# Patient Record
Sex: FEMALE | Race: WHITE | NOT HISPANIC OR LATINO | Employment: OTHER | URBAN - METROPOLITAN AREA
[De-identification: names, ages, dates, MRNs, and addresses within clinical notes are randomized per-mention and may not be internally consistent; named-entity substitution may affect disease eponyms.]

---

## 2017-01-24 ENCOUNTER — COMMUNICATION - HEALTHEAST (OUTPATIENT)
Dept: INTERNAL MEDICINE | Facility: CLINIC | Age: 82
End: 2017-01-24

## 2017-01-24 ENCOUNTER — OFFICE VISIT - HEALTHEAST (OUTPATIENT)
Dept: INTERNAL MEDICINE | Facility: CLINIC | Age: 82
End: 2017-01-24

## 2017-01-24 DIAGNOSIS — E11.9 TYPE 2 DIABETES MELLITUS (H): ICD-10-CM

## 2017-01-24 DIAGNOSIS — M54.16 LUMBAR RADICULOPATHY, RIGHT: ICD-10-CM

## 2017-01-24 LAB — HBA1C MFR BLD: 7.7 % (ref 3.5–6)

## 2017-01-24 ASSESSMENT — MIFFLIN-ST. JEOR: SCORE: 1187.49

## 2017-04-18 ENCOUNTER — COMMUNICATION - HEALTHEAST (OUTPATIENT)
Dept: INTERNAL MEDICINE | Facility: CLINIC | Age: 82
End: 2017-04-18

## 2017-04-18 DIAGNOSIS — E11.9 TYPE 2 DIABETES MELLITUS (H): ICD-10-CM

## 2017-05-06 ENCOUNTER — COMMUNICATION - HEALTHEAST (OUTPATIENT)
Dept: INTERNAL MEDICINE | Facility: CLINIC | Age: 82
End: 2017-05-06

## 2017-05-25 ENCOUNTER — RECORDS - HEALTHEAST (OUTPATIENT)
Dept: ADMINISTRATIVE | Facility: OTHER | Age: 82
End: 2017-05-25

## 2017-06-06 ENCOUNTER — COMMUNICATION - HEALTHEAST (OUTPATIENT)
Dept: INTERNAL MEDICINE | Facility: CLINIC | Age: 82
End: 2017-06-06

## 2017-06-06 DIAGNOSIS — E78.5 HYPERLIPIDEMIA: ICD-10-CM

## 2017-06-27 ENCOUNTER — OFFICE VISIT - HEALTHEAST (OUTPATIENT)
Dept: INTERNAL MEDICINE | Facility: CLINIC | Age: 82
End: 2017-06-27

## 2017-06-27 DIAGNOSIS — E78.2 HYPERLIPEMIA, MIXED: ICD-10-CM

## 2017-06-27 DIAGNOSIS — G47.62 NOCTURNAL LEG CRAMPS: ICD-10-CM

## 2017-06-27 DIAGNOSIS — E11.9 TYPE 2 DIABETES MELLITUS (H): ICD-10-CM

## 2017-06-27 DIAGNOSIS — E03.9 HYPOTHYROIDISM: ICD-10-CM

## 2017-06-27 DIAGNOSIS — K21.00 GASTROESOPHAGEAL REFLUX DISEASE WITH ESOPHAGITIS: ICD-10-CM

## 2017-06-27 DIAGNOSIS — E78.5 HYPERLIPIDEMIA: ICD-10-CM

## 2017-06-27 LAB
CHOLEST SERPL-MCNC: 124 MG/DL
FASTING STATUS PATIENT QL REPORTED: ABNORMAL
HBA1C MFR BLD: 6.1 % (ref 3.5–6)
HDLC SERPL-MCNC: 39 MG/DL
LDLC SERPL CALC-MCNC: 54 MG/DL
TRIGL SERPL-MCNC: 155 MG/DL

## 2017-06-27 ASSESSMENT — MIFFLIN-ST. JEOR: SCORE: 1157.1

## 2017-07-03 ENCOUNTER — COMMUNICATION - HEALTHEAST (OUTPATIENT)
Dept: INTERNAL MEDICINE | Facility: CLINIC | Age: 82
End: 2017-07-03

## 2017-07-10 ENCOUNTER — OFFICE VISIT - HEALTHEAST (OUTPATIENT)
Dept: EDUCATION SERVICES | Facility: CLINIC | Age: 82
End: 2017-07-10

## 2017-07-10 DIAGNOSIS — E11.9 DIABETES MELLITUS TYPE 2, CONTROLLED (H): ICD-10-CM

## 2017-08-18 ENCOUNTER — COMMUNICATION - HEALTHEAST (OUTPATIENT)
Dept: INTERNAL MEDICINE | Facility: CLINIC | Age: 82
End: 2017-08-18

## 2017-08-18 DIAGNOSIS — I10 HYPERTENSION: ICD-10-CM

## 2017-08-18 DIAGNOSIS — E03.9 HYPOTHYROIDISM: ICD-10-CM

## 2017-08-18 DIAGNOSIS — E78.5 HYPERLIPIDEMIA: ICD-10-CM

## 2017-08-24 ENCOUNTER — COMMUNICATION - HEALTHEAST (OUTPATIENT)
Dept: INTERNAL MEDICINE | Facility: CLINIC | Age: 82
End: 2017-08-24

## 2017-09-18 ENCOUNTER — COMMUNICATION - HEALTHEAST (OUTPATIENT)
Dept: INTERNAL MEDICINE | Facility: CLINIC | Age: 82
End: 2017-09-18

## 2017-09-18 DIAGNOSIS — E78.5 HYPERLIPIDEMIA: ICD-10-CM

## 2017-10-05 ENCOUNTER — COMMUNICATION - HEALTHEAST (OUTPATIENT)
Dept: INTERNAL MEDICINE | Facility: CLINIC | Age: 82
End: 2017-10-05

## 2017-11-07 ENCOUNTER — OFFICE VISIT - HEALTHEAST (OUTPATIENT)
Dept: INTERNAL MEDICINE | Facility: CLINIC | Age: 82
End: 2017-11-07

## 2017-11-07 DIAGNOSIS — I10 ESSENTIAL HYPERTENSION: ICD-10-CM

## 2017-11-07 DIAGNOSIS — E11.9 DIABETES TYPE 2, CONTROLLED (H): ICD-10-CM

## 2017-11-07 DIAGNOSIS — E78.2 MIXED HYPERLIPIDEMIA: ICD-10-CM

## 2017-11-07 DIAGNOSIS — E03.9 HYPOTHYROIDISM: ICD-10-CM

## 2017-11-07 DIAGNOSIS — I73.9 CLAUDICATION OF BOTH LOWER EXTREMITIES (H): ICD-10-CM

## 2017-11-07 DIAGNOSIS — E83.42 HYPOMAGNESEMIA: ICD-10-CM

## 2017-11-07 LAB
CHOLEST SERPL-MCNC: 167 MG/DL
FASTING STATUS PATIENT QL REPORTED: ABNORMAL
HBA1C MFR BLD: 6.3 % (ref 3.5–6)
HDLC SERPL-MCNC: 46 MG/DL
LDLC SERPL CALC-MCNC: 96 MG/DL
TRIGL SERPL-MCNC: 124 MG/DL

## 2017-11-07 ASSESSMENT — MIFFLIN-ST. JEOR: SCORE: 1165.72

## 2017-11-10 ENCOUNTER — COMMUNICATION - HEALTHEAST (OUTPATIENT)
Dept: INTERNAL MEDICINE | Facility: CLINIC | Age: 82
End: 2017-11-10

## 2017-11-10 DIAGNOSIS — E03.9 HYPOTHYROIDISM: ICD-10-CM

## 2017-12-14 ENCOUNTER — COMMUNICATION - HEALTHEAST (OUTPATIENT)
Dept: SCHEDULING | Facility: CLINIC | Age: 82
End: 2017-12-14

## 2017-12-22 ENCOUNTER — OFFICE VISIT - HEALTHEAST (OUTPATIENT)
Dept: INTERNAL MEDICINE | Facility: CLINIC | Age: 82
End: 2017-12-22

## 2017-12-22 DIAGNOSIS — K52.9 GASTROENTERITIS: ICD-10-CM

## 2017-12-22 DIAGNOSIS — E11.9 DIABETES MELLITUS, TYPE 2 (H): ICD-10-CM

## 2017-12-22 ASSESSMENT — MIFFLIN-ST. JEOR: SCORE: 1141.22

## 2017-12-27 ENCOUNTER — COMMUNICATION - HEALTHEAST (OUTPATIENT)
Dept: INTERNAL MEDICINE | Facility: CLINIC | Age: 82
End: 2017-12-27

## 2018-05-02 ENCOUNTER — OFFICE VISIT - HEALTHEAST (OUTPATIENT)
Dept: INTERNAL MEDICINE | Facility: CLINIC | Age: 83
End: 2018-05-02

## 2018-05-02 DIAGNOSIS — D12.6 BENIGN NEOPLASM OF COLON: ICD-10-CM

## 2018-05-02 DIAGNOSIS — R39.9 UTI SYMPTOMS: ICD-10-CM

## 2018-05-02 DIAGNOSIS — E11.8 CONTROLLED TYPE 2 DIABETES MELLITUS WITH COMPLICATION, WITHOUT LONG-TERM CURRENT USE OF INSULIN (H): ICD-10-CM

## 2018-05-02 LAB
ALBUMIN SERPL-MCNC: 3.9 G/DL (ref 3.5–5)
ALBUMIN UR-MCNC: NEGATIVE MG/DL
ALP SERPL-CCNC: 63 U/L (ref 45–120)
ALT SERPL W P-5'-P-CCNC: 22 U/L (ref 0–45)
ANION GAP SERPL CALCULATED.3IONS-SCNC: 9 MMOL/L (ref 5–18)
APPEARANCE UR: CLEAR
AST SERPL W P-5'-P-CCNC: 18 U/L (ref 0–40)
BACTERIA #/AREA URNS HPF: ABNORMAL HPF
BILIRUB SERPL-MCNC: 0.5 MG/DL (ref 0–1)
BILIRUB UR QL STRIP: NEGATIVE
BUN SERPL-MCNC: 12 MG/DL (ref 8–28)
CALCIUM SERPL-MCNC: 9.5 MG/DL (ref 8.5–10.5)
CHLORIDE BLD-SCNC: 96 MMOL/L (ref 98–107)
CO2 SERPL-SCNC: 29 MMOL/L (ref 22–31)
COLOR UR AUTO: YELLOW
CREAT SERPL-MCNC: 0.76 MG/DL (ref 0.6–1.1)
GFR SERPL CREATININE-BSD FRML MDRD: >60 ML/MIN/1.73M2
GLUCOSE BLD-MCNC: 110 MG/DL (ref 70–125)
GLUCOSE UR STRIP-MCNC: NEGATIVE MG/DL
HBA1C MFR BLD: 6.8 % (ref 3.5–6)
HGB UR QL STRIP: ABNORMAL
KETONES UR STRIP-MCNC: NEGATIVE MG/DL
LEUKOCYTE ESTERASE UR QL STRIP: NEGATIVE
NITRATE UR QL: NEGATIVE
PH UR STRIP: 7.5 [PH] (ref 5–8)
POTASSIUM BLD-SCNC: 4.1 MMOL/L (ref 3.5–5)
PROT SERPL-MCNC: 6.9 G/DL (ref 6–8)
RBC #/AREA URNS AUTO: ABNORMAL HPF
SODIUM SERPL-SCNC: 134 MMOL/L (ref 136–145)
SP GR UR STRIP: 1.02 (ref 1–1.03)
SQUAMOUS #/AREA URNS AUTO: ABNORMAL LPF
UROBILINOGEN UR STRIP-ACNC: ABNORMAL
WBC #/AREA URNS AUTO: ABNORMAL HPF

## 2018-05-02 ASSESSMENT — MIFFLIN-ST. JEOR: SCORE: 1153.01

## 2018-05-03 ENCOUNTER — HOSPITAL ENCOUNTER (OUTPATIENT)
Dept: ULTRASOUND IMAGING | Facility: CLINIC | Age: 83
Discharge: HOME | End: 2018-05-03
Attending: INTERNAL MEDICINE

## 2018-05-03 DIAGNOSIS — R39.9 UTI SYMPTOMS: ICD-10-CM

## 2018-05-07 ENCOUNTER — COMMUNICATION - HEALTHEAST (OUTPATIENT)
Dept: INTERNAL MEDICINE | Facility: CLINIC | Age: 83
End: 2018-05-07

## 2018-05-11 ENCOUNTER — COMMUNICATION - HEALTHEAST (OUTPATIENT)
Dept: INTERNAL MEDICINE | Facility: CLINIC | Age: 83
End: 2018-05-11

## 2018-05-11 DIAGNOSIS — E11.9 TYPE 2 DIABETES MELLITUS (H): ICD-10-CM

## 2018-06-04 ENCOUNTER — OFFICE VISIT - HEALTHEAST (OUTPATIENT)
Dept: INTERNAL MEDICINE | Facility: CLINIC | Age: 83
End: 2018-06-04

## 2018-06-04 DIAGNOSIS — Z01.818 PREOPERATIVE EXAMINATION: ICD-10-CM

## 2018-06-04 DIAGNOSIS — K63.5 COLON POLYPS: ICD-10-CM

## 2018-06-04 DIAGNOSIS — E11.9 DIABETES TYPE 2, CONTROLLED (H): ICD-10-CM

## 2018-06-04 DIAGNOSIS — I10 HYPERTENSION: ICD-10-CM

## 2018-06-04 DIAGNOSIS — I73.9 PERIPHERAL VASCULAR DISEASE (H): ICD-10-CM

## 2018-06-04 DIAGNOSIS — E78.5 HYPERLIPIDEMIA: ICD-10-CM

## 2018-06-04 LAB
ANION GAP SERPL CALCULATED.3IONS-SCNC: 8 MMOL/L (ref 5–18)
BUN SERPL-MCNC: 10 MG/DL (ref 8–28)
CALCIUM SERPL-MCNC: 10.1 MG/DL (ref 8.5–10.5)
CHLORIDE BLD-SCNC: 94 MMOL/L (ref 98–107)
CO2 SERPL-SCNC: 32 MMOL/L (ref 22–31)
CREAT SERPL-MCNC: 0.78 MG/DL (ref 0.6–1.1)
ERYTHROCYTE [DISTWIDTH] IN BLOOD BY AUTOMATED COUNT: 13.6 % (ref 11–14.5)
GFR SERPL CREATININE-BSD FRML MDRD: >60 ML/MIN/1.73M2
GLUCOSE BLD-MCNC: 92 MG/DL (ref 70–125)
HCT VFR BLD AUTO: 40.8 % (ref 35–47)
HGB BLD-MCNC: 14 G/DL (ref 12–16)
MCH RBC QN AUTO: 28.7 PG (ref 27–34)
MCHC RBC AUTO-ENTMCNC: 34.4 G/DL (ref 32–36)
MCV RBC AUTO: 83 FL (ref 80–100)
PLATELET # BLD AUTO: 245 THOU/UL (ref 140–440)
PMV BLD AUTO: 7 FL (ref 7–10)
POTASSIUM BLD-SCNC: 3.6 MMOL/L (ref 3.5–5)
RBC # BLD AUTO: 4.89 MILL/UL (ref 3.8–5.4)
SODIUM SERPL-SCNC: 134 MMOL/L (ref 136–145)
WBC: 5.8 THOU/UL (ref 4–11)

## 2018-06-04 ASSESSMENT — MIFFLIN-ST. JEOR: SCORE: 1169.29

## 2018-06-06 ENCOUNTER — ANESTHESIA - HEALTHEAST (OUTPATIENT)
Dept: SURGERY | Facility: CLINIC | Age: 83
End: 2018-06-06

## 2018-06-06 ENCOUNTER — SURGERY - HEALTHEAST (OUTPATIENT)
Dept: SURGERY | Facility: CLINIC | Age: 83
End: 2018-06-06

## 2018-06-06 ASSESSMENT — MIFFLIN-ST. JEOR: SCORE: 1156.25

## 2018-06-28 ENCOUNTER — COMMUNICATION - HEALTHEAST (OUTPATIENT)
Dept: INTERNAL MEDICINE | Facility: CLINIC | Age: 83
End: 2018-06-28

## 2018-06-28 DIAGNOSIS — E78.5 HYPERLIPIDEMIA: ICD-10-CM

## 2018-08-25 ENCOUNTER — COMMUNICATION - HEALTHEAST (OUTPATIENT)
Dept: INTERNAL MEDICINE | Facility: CLINIC | Age: 83
End: 2018-08-25

## 2018-08-25 DIAGNOSIS — K21.00 GASTROESOPHAGEAL REFLUX DISEASE WITH ESOPHAGITIS: ICD-10-CM

## 2018-09-25 ENCOUNTER — COMMUNICATION - HEALTHEAST (OUTPATIENT)
Dept: INTERNAL MEDICINE | Facility: CLINIC | Age: 83
End: 2018-09-25

## 2018-09-25 DIAGNOSIS — I10 HYPERTENSION: ICD-10-CM

## 2018-09-25 DIAGNOSIS — E78.5 HYPERLIPIDEMIA: ICD-10-CM

## 2018-10-05 ENCOUNTER — COMMUNICATION - HEALTHEAST (OUTPATIENT)
Dept: INTERNAL MEDICINE | Facility: CLINIC | Age: 83
End: 2018-10-05

## 2018-10-05 DIAGNOSIS — E78.5 HYPERLIPIDEMIA: ICD-10-CM

## 2018-10-30 ENCOUNTER — COMMUNICATION - HEALTHEAST (OUTPATIENT)
Dept: INTERNAL MEDICINE | Facility: CLINIC | Age: 83
End: 2018-10-30

## 2018-10-30 DIAGNOSIS — J20.9 ACUTE BRONCHITIS: ICD-10-CM

## 2018-11-15 ENCOUNTER — COMMUNICATION - HEALTHEAST (OUTPATIENT)
Dept: SCHEDULING | Facility: CLINIC | Age: 83
End: 2018-11-15

## 2018-11-23 ENCOUNTER — OFFICE VISIT - HEALTHEAST (OUTPATIENT)
Dept: INTERNAL MEDICINE | Facility: CLINIC | Age: 83
End: 2018-11-23

## 2018-11-23 DIAGNOSIS — I10 ESSENTIAL HYPERTENSION, BENIGN: ICD-10-CM

## 2018-11-23 DIAGNOSIS — Z51.81 ENCOUNTER FOR MEDICATION MONITORING: ICD-10-CM

## 2018-11-23 DIAGNOSIS — E11.9 DM2 (DIABETES MELLITUS, TYPE 2) (H): ICD-10-CM

## 2018-11-23 DIAGNOSIS — E78.2 MIXED HYPERLIPIDEMIA: ICD-10-CM

## 2018-11-23 DIAGNOSIS — M54.16 LUMBAR RADICULOPATHY: ICD-10-CM

## 2018-11-23 DIAGNOSIS — Z12.31 VISIT FOR SCREENING MAMMOGRAM: ICD-10-CM

## 2018-11-23 DIAGNOSIS — E11.9 TYPE 2 DIABETES MELLITUS WITHOUT COMPLICATION, WITHOUT LONG-TERM CURRENT USE OF INSULIN (H): ICD-10-CM

## 2018-11-23 LAB
ALBUMIN SERPL-MCNC: 4.1 G/DL (ref 3.5–5)
ALP SERPL-CCNC: 81 U/L (ref 45–120)
ALT SERPL W P-5'-P-CCNC: 27 U/L (ref 0–45)
ANION GAP SERPL CALCULATED.3IONS-SCNC: 11 MMOL/L (ref 5–18)
AST SERPL W P-5'-P-CCNC: 22 U/L (ref 0–40)
BILIRUB SERPL-MCNC: 0.4 MG/DL (ref 0–1)
BUN SERPL-MCNC: 13 MG/DL (ref 8–28)
CALCIUM SERPL-MCNC: 10.2 MG/DL (ref 8.5–10.5)
CHLORIDE BLD-SCNC: 97 MMOL/L (ref 98–107)
CHOLEST SERPL-MCNC: 150 MG/DL
CO2 SERPL-SCNC: 30 MMOL/L (ref 22–31)
CREAT SERPL-MCNC: 0.73 MG/DL (ref 0.6–1.1)
CREAT UR-MCNC: 50.3 MG/DL
ERYTHROCYTE [DISTWIDTH] IN BLOOD BY AUTOMATED COUNT: 11.5 % (ref 11–14.5)
FASTING STATUS PATIENT QL REPORTED: ABNORMAL
GFR SERPL CREATININE-BSD FRML MDRD: >60 ML/MIN/1.73M2
GLUCOSE BLD-MCNC: 78 MG/DL (ref 70–125)
HBA1C MFR BLD: 6.9 % (ref 3.5–6)
HCT VFR BLD AUTO: 41 % (ref 35–47)
HDLC SERPL-MCNC: 43 MG/DL
HGB BLD-MCNC: 14 G/DL (ref 12–16)
LDLC SERPL CALC-MCNC: 60 MG/DL
MCH RBC QN AUTO: 28.3 PG (ref 27–34)
MCHC RBC AUTO-ENTMCNC: 34 G/DL (ref 32–36)
MCV RBC AUTO: 83 FL (ref 80–100)
MICROALBUMIN UR-MCNC: <0.5 MG/DL (ref 0–1.99)
MICROALBUMIN/CREAT UR: NORMAL MG/G
PLATELET # BLD AUTO: 249 THOU/UL (ref 140–440)
PMV BLD AUTO: 7.2 FL (ref 7–10)
POTASSIUM BLD-SCNC: 3.8 MMOL/L (ref 3.5–5)
PROT SERPL-MCNC: 7.2 G/DL (ref 6–8)
RBC # BLD AUTO: 4.93 MILL/UL (ref 3.8–5.4)
SODIUM SERPL-SCNC: 138 MMOL/L (ref 136–145)
TRIGL SERPL-MCNC: 234 MG/DL
WBC: 7.6 THOU/UL (ref 4–11)

## 2018-11-23 ASSESSMENT — MIFFLIN-ST. JEOR: SCORE: 1155.11

## 2018-11-28 ENCOUNTER — COMMUNICATION - HEALTHEAST (OUTPATIENT)
Dept: INTERNAL MEDICINE | Facility: CLINIC | Age: 83
End: 2018-11-28

## 2018-11-30 ENCOUNTER — COMMUNICATION - HEALTHEAST (OUTPATIENT)
Dept: INTERNAL MEDICINE | Facility: CLINIC | Age: 83
End: 2018-11-30

## 2018-11-30 ENCOUNTER — HOSPITAL ENCOUNTER (OUTPATIENT)
Dept: MRI IMAGING | Facility: CLINIC | Age: 83
Discharge: HOME | End: 2018-11-30
Attending: INTERNAL MEDICINE

## 2018-11-30 DIAGNOSIS — M54.16 LUMBAR RADICULOPATHY: ICD-10-CM

## 2018-12-03 ENCOUNTER — HOSPITAL ENCOUNTER (OUTPATIENT)
Dept: CT IMAGING | Facility: CLINIC | Age: 83
Discharge: HOME | End: 2018-12-03
Attending: INTERNAL MEDICINE

## 2018-12-03 ENCOUNTER — HOSPITAL ENCOUNTER (OUTPATIENT)
Dept: MAMMOGRAPHY | Facility: CLINIC | Age: 83
Discharge: HOME | End: 2018-12-03
Attending: INTERNAL MEDICINE

## 2018-12-03 DIAGNOSIS — M54.16 LUMBAR RADICULOPATHY: ICD-10-CM

## 2018-12-03 DIAGNOSIS — Z12.31 VISIT FOR SCREENING MAMMOGRAM: ICD-10-CM

## 2018-12-11 ENCOUNTER — COMMUNICATION - HEALTHEAST (OUTPATIENT)
Dept: INTERNAL MEDICINE | Facility: CLINIC | Age: 83
End: 2018-12-11

## 2018-12-11 DIAGNOSIS — E03.9 HYPOTHYROIDISM: ICD-10-CM

## 2018-12-13 ENCOUNTER — COMMUNICATION - HEALTHEAST (OUTPATIENT)
Dept: INTERNAL MEDICINE | Facility: CLINIC | Age: 83
End: 2018-12-13

## 2018-12-13 ENCOUNTER — AMBULATORY - HEALTHEAST (OUTPATIENT)
Dept: INTERNAL MEDICINE | Facility: CLINIC | Age: 83
End: 2018-12-13

## 2018-12-13 DIAGNOSIS — M54.16 LUMBAR RADICULOPATHY: ICD-10-CM

## 2018-12-19 ENCOUNTER — HOSPITAL ENCOUNTER (OUTPATIENT)
Dept: PHYSICAL MEDICINE AND REHAB | Facility: CLINIC | Age: 83
Discharge: HOME | End: 2018-12-19
Attending: INTERNAL MEDICINE

## 2018-12-19 DIAGNOSIS — M48.061 LUMBAR FORAMINAL STENOSIS: ICD-10-CM

## 2018-12-19 DIAGNOSIS — M54.16 LEFT LUMBAR RADICULITIS: ICD-10-CM

## 2018-12-19 DIAGNOSIS — M48.062 SPINAL STENOSIS OF LUMBAR REGION WITH NEUROGENIC CLAUDICATION: ICD-10-CM

## 2018-12-19 DIAGNOSIS — M54.42 ACUTE LEFT-SIDED LOW BACK PAIN WITH LEFT-SIDED SCIATICA: ICD-10-CM

## 2018-12-19 DIAGNOSIS — M54.16 LEFT LUMBAR RADICULOPATHY: ICD-10-CM

## 2019-03-08 ENCOUNTER — RECORDS - HEALTHEAST (OUTPATIENT)
Dept: ADMINISTRATIVE | Facility: OTHER | Age: 84
End: 2019-03-08

## 2019-03-20 ENCOUNTER — OFFICE VISIT - HEALTHEAST (OUTPATIENT)
Dept: INTERNAL MEDICINE | Facility: CLINIC | Age: 84
End: 2019-03-20

## 2019-03-20 DIAGNOSIS — S80.02XA CONTUSION OF LEFT KNEE AND LOWER LEG, INITIAL ENCOUNTER: ICD-10-CM

## 2019-03-20 DIAGNOSIS — M48.061 SPINAL STENOSIS OF LUMBAR REGION WITHOUT NEUROGENIC CLAUDICATION: ICD-10-CM

## 2019-03-20 DIAGNOSIS — S80.12XA CONTUSION OF LEFT KNEE AND LOWER LEG, INITIAL ENCOUNTER: ICD-10-CM

## 2019-03-20 DIAGNOSIS — E11.8 CONTROLLED TYPE 2 DIABETES MELLITUS WITH COMPLICATION, WITHOUT LONG-TERM CURRENT USE OF INSULIN (H): ICD-10-CM

## 2019-03-20 ASSESSMENT — MIFFLIN-ST. JEOR: SCORE: 1132.15

## 2019-04-04 ENCOUNTER — RECORDS - HEALTHEAST (OUTPATIENT)
Dept: ADMINISTRATIVE | Facility: OTHER | Age: 84
End: 2019-04-04

## 2019-05-01 ENCOUNTER — COMMUNICATION - HEALTHEAST (OUTPATIENT)
Dept: INTERNAL MEDICINE | Facility: CLINIC | Age: 84
End: 2019-05-01

## 2019-05-01 DIAGNOSIS — I10 HYPERTENSION: ICD-10-CM

## 2019-05-01 DIAGNOSIS — E11.9 TYPE 2 DIABETES MELLITUS (H): ICD-10-CM

## 2019-05-07 ENCOUNTER — COMMUNICATION - HEALTHEAST (OUTPATIENT)
Dept: INTERNAL MEDICINE | Facility: CLINIC | Age: 84
End: 2019-05-07

## 2019-06-14 ENCOUNTER — RECORDS - HEALTHEAST (OUTPATIENT)
Dept: ADMINISTRATIVE | Facility: OTHER | Age: 84
End: 2019-06-14

## 2019-07-01 ENCOUNTER — OFFICE VISIT - HEALTHEAST (OUTPATIENT)
Dept: INTERNAL MEDICINE | Facility: CLINIC | Age: 84
End: 2019-07-01

## 2019-07-01 DIAGNOSIS — G89.29 CHRONIC MIDLINE LOW BACK PAIN WITH LEFT-SIDED SCIATICA: ICD-10-CM

## 2019-07-01 DIAGNOSIS — E11.8 CONTROLLED TYPE 2 DIABETES MELLITUS WITH COMPLICATION, WITHOUT LONG-TERM CURRENT USE OF INSULIN (H): ICD-10-CM

## 2019-07-01 DIAGNOSIS — E03.9 ACQUIRED HYPOTHYROIDISM: ICD-10-CM

## 2019-07-01 DIAGNOSIS — M89.9 DISORDER OF BONE AND CARTILAGE: ICD-10-CM

## 2019-07-01 DIAGNOSIS — M79.605 PAIN OF LEFT LOWER EXTREMITY: ICD-10-CM

## 2019-07-01 DIAGNOSIS — I73.9 PERIPHERAL VASCULAR DISEASE (H): ICD-10-CM

## 2019-07-01 DIAGNOSIS — M94.9 DISORDER OF BONE AND CARTILAGE: ICD-10-CM

## 2019-07-01 DIAGNOSIS — M54.42 CHRONIC MIDLINE LOW BACK PAIN WITH LEFT-SIDED SCIATICA: ICD-10-CM

## 2019-07-01 LAB
ANION GAP SERPL CALCULATED.3IONS-SCNC: 8 MMOL/L (ref 5–18)
BUN SERPL-MCNC: 14 MG/DL (ref 8–28)
CALCIUM SERPL-MCNC: 10 MG/DL (ref 8.5–10.5)
CHLORIDE BLD-SCNC: 102 MMOL/L (ref 98–107)
CO2 SERPL-SCNC: 28 MMOL/L (ref 22–31)
CREAT SERPL-MCNC: 0.74 MG/DL (ref 0.6–1.1)
CREAT UR-MCNC: 30.5 MG/DL
GFR SERPL CREATININE-BSD FRML MDRD: >60 ML/MIN/1.73M2
GLUCOSE BLD-MCNC: 140 MG/DL (ref 70–125)
HBA1C MFR BLD: 6.4 % (ref 3.5–6)
MICROALBUMIN UR-MCNC: 0.59 MG/DL (ref 0–1.99)
MICROALBUMIN/CREAT UR: 19.3 MG/G
POTASSIUM BLD-SCNC: 3.8 MMOL/L (ref 3.5–5)
SODIUM SERPL-SCNC: 138 MMOL/L (ref 136–145)
TSH SERPL DL<=0.005 MIU/L-ACNC: 0.61 UIU/ML (ref 0.3–5)

## 2019-07-01 ASSESSMENT — MIFFLIN-ST. JEOR: SCORE: 1141.22

## 2019-07-03 ENCOUNTER — COMMUNICATION - HEALTHEAST (OUTPATIENT)
Dept: INTERNAL MEDICINE | Facility: CLINIC | Age: 84
End: 2019-07-03

## 2019-07-08 ENCOUNTER — HOSPITAL ENCOUNTER (OUTPATIENT)
Dept: ULTRASOUND IMAGING | Facility: CLINIC | Age: 84
Discharge: HOME | End: 2019-07-08
Attending: INTERNAL MEDICINE

## 2019-07-08 DIAGNOSIS — I73.9 PERIPHERAL VASCULAR DISEASE (H): ICD-10-CM

## 2019-07-08 DIAGNOSIS — M79.605 PAIN OF LEFT LOWER EXTREMITY: ICD-10-CM

## 2019-07-09 ENCOUNTER — OFFICE VISIT - HEALTHEAST (OUTPATIENT)
Dept: PHYSICAL THERAPY | Facility: REHABILITATION | Age: 84
End: 2019-07-09

## 2019-07-09 DIAGNOSIS — G89.29 CHRONIC MIDLINE LOW BACK PAIN WITH LEFT-SIDED SCIATICA: ICD-10-CM

## 2019-07-09 DIAGNOSIS — M53.86 DECREASED ROM OF LUMBAR SPINE: ICD-10-CM

## 2019-07-09 DIAGNOSIS — M54.42 CHRONIC MIDLINE LOW BACK PAIN WITH LEFT-SIDED SCIATICA: ICD-10-CM

## 2019-07-09 DIAGNOSIS — R29.898 LEFT LEG WEAKNESS: ICD-10-CM

## 2019-07-09 DIAGNOSIS — M25.662 DECREASED RANGE OF MOTION OF LEFT LOWER EXTREMITY: ICD-10-CM

## 2019-07-09 DIAGNOSIS — R20.0 NUMBNESS OF LEFT LOWER EXTREMITY: ICD-10-CM

## 2019-07-10 ENCOUNTER — COMMUNICATION - HEALTHEAST (OUTPATIENT)
Dept: INTERNAL MEDICINE | Facility: CLINIC | Age: 84
End: 2019-07-10

## 2019-07-11 ENCOUNTER — OFFICE VISIT - HEALTHEAST (OUTPATIENT)
Dept: PHYSICAL THERAPY | Facility: REHABILITATION | Age: 84
End: 2019-07-11

## 2019-07-11 DIAGNOSIS — M25.662 DECREASED RANGE OF MOTION OF LEFT LOWER EXTREMITY: ICD-10-CM

## 2019-07-11 DIAGNOSIS — M53.86 DECREASED ROM OF LUMBAR SPINE: ICD-10-CM

## 2019-07-11 DIAGNOSIS — M54.42 CHRONIC MIDLINE LOW BACK PAIN WITH LEFT-SIDED SCIATICA: ICD-10-CM

## 2019-07-11 DIAGNOSIS — R29.898 LEFT LEG WEAKNESS: ICD-10-CM

## 2019-07-11 DIAGNOSIS — G89.29 CHRONIC MIDLINE LOW BACK PAIN WITH LEFT-SIDED SCIATICA: ICD-10-CM

## 2019-07-23 ENCOUNTER — OFFICE VISIT - HEALTHEAST (OUTPATIENT)
Dept: PHYSICAL THERAPY | Facility: REHABILITATION | Age: 84
End: 2019-07-23

## 2019-07-23 DIAGNOSIS — M53.86 DECREASED ROM OF LUMBAR SPINE: ICD-10-CM

## 2019-07-23 DIAGNOSIS — R29.898 LEFT LEG WEAKNESS: ICD-10-CM

## 2019-07-23 DIAGNOSIS — M54.42 CHRONIC MIDLINE LOW BACK PAIN WITH LEFT-SIDED SCIATICA: ICD-10-CM

## 2019-07-23 DIAGNOSIS — M25.662 DECREASED RANGE OF MOTION OF LEFT LOWER EXTREMITY: ICD-10-CM

## 2019-07-23 DIAGNOSIS — R20.0 NUMBNESS OF LEFT LOWER EXTREMITY: ICD-10-CM

## 2019-07-23 DIAGNOSIS — G89.29 CHRONIC MIDLINE LOW BACK PAIN WITH LEFT-SIDED SCIATICA: ICD-10-CM

## 2019-07-25 ENCOUNTER — OFFICE VISIT - HEALTHEAST (OUTPATIENT)
Dept: PHYSICAL THERAPY | Facility: REHABILITATION | Age: 84
End: 2019-07-25

## 2019-07-25 DIAGNOSIS — G89.29 CHRONIC MIDLINE LOW BACK PAIN WITH LEFT-SIDED SCIATICA: ICD-10-CM

## 2019-07-25 DIAGNOSIS — M53.86 DECREASED ROM OF LUMBAR SPINE: ICD-10-CM

## 2019-07-25 DIAGNOSIS — M25.662 DECREASED RANGE OF MOTION OF LEFT LOWER EXTREMITY: ICD-10-CM

## 2019-07-25 DIAGNOSIS — R20.0 NUMBNESS OF LEFT LOWER EXTREMITY: ICD-10-CM

## 2019-07-25 DIAGNOSIS — M54.42 CHRONIC MIDLINE LOW BACK PAIN WITH LEFT-SIDED SCIATICA: ICD-10-CM

## 2019-07-25 DIAGNOSIS — R29.898 LEFT LEG WEAKNESS: ICD-10-CM

## 2019-08-01 ENCOUNTER — OFFICE VISIT - HEALTHEAST (OUTPATIENT)
Dept: PHYSICAL THERAPY | Facility: REHABILITATION | Age: 84
End: 2019-08-01

## 2019-08-01 DIAGNOSIS — R20.0 NUMBNESS OF LEFT LOWER EXTREMITY: ICD-10-CM

## 2019-08-01 DIAGNOSIS — M25.662 DECREASED RANGE OF MOTION OF LEFT LOWER EXTREMITY: ICD-10-CM

## 2019-08-01 DIAGNOSIS — G89.29 CHRONIC MIDLINE LOW BACK PAIN WITH LEFT-SIDED SCIATICA: ICD-10-CM

## 2019-08-01 DIAGNOSIS — M53.86 DECREASED ROM OF LUMBAR SPINE: ICD-10-CM

## 2019-08-01 DIAGNOSIS — R29.898 LEFT LEG WEAKNESS: ICD-10-CM

## 2019-08-01 DIAGNOSIS — M54.42 CHRONIC MIDLINE LOW BACK PAIN WITH LEFT-SIDED SCIATICA: ICD-10-CM

## 2019-08-07 ENCOUNTER — OFFICE VISIT - HEALTHEAST (OUTPATIENT)
Dept: PHYSICAL THERAPY | Facility: REHABILITATION | Age: 84
End: 2019-08-07

## 2019-08-07 DIAGNOSIS — M53.86 DECREASED ROM OF LUMBAR SPINE: ICD-10-CM

## 2019-08-07 DIAGNOSIS — M25.662 DECREASED RANGE OF MOTION OF LEFT LOWER EXTREMITY: ICD-10-CM

## 2019-08-07 DIAGNOSIS — G89.29 CHRONIC MIDLINE LOW BACK PAIN WITH LEFT-SIDED SCIATICA: ICD-10-CM

## 2019-08-07 DIAGNOSIS — M54.42 CHRONIC MIDLINE LOW BACK PAIN WITH LEFT-SIDED SCIATICA: ICD-10-CM

## 2019-08-07 DIAGNOSIS — R29.898 LEFT LEG WEAKNESS: ICD-10-CM

## 2019-08-07 DIAGNOSIS — R20.0 NUMBNESS OF LEFT LOWER EXTREMITY: ICD-10-CM

## 2019-08-26 ENCOUNTER — COMMUNICATION - HEALTHEAST (OUTPATIENT)
Dept: INTERNAL MEDICINE | Facility: CLINIC | Age: 84
End: 2019-08-26

## 2019-08-26 DIAGNOSIS — I10 ESSENTIAL HYPERTENSION: ICD-10-CM

## 2019-09-23 ENCOUNTER — COMMUNICATION - HEALTHEAST (OUTPATIENT)
Dept: INTERNAL MEDICINE | Facility: CLINIC | Age: 84
End: 2019-09-23

## 2019-09-23 DIAGNOSIS — E78.5 HYPERLIPIDEMIA: ICD-10-CM

## 2019-10-01 ENCOUNTER — OFFICE VISIT - HEALTHEAST (OUTPATIENT)
Dept: INTERNAL MEDICINE | Facility: CLINIC | Age: 84
End: 2019-10-01

## 2019-10-01 DIAGNOSIS — I70.213 ATHEROSCLEROSIS OF NATIVE ARTERY OF BOTH LOWER EXTREMITIES WITH INTERMITTENT CLAUDICATION (H): ICD-10-CM

## 2019-10-01 DIAGNOSIS — I10 HYPERTENSION: ICD-10-CM

## 2019-10-01 DIAGNOSIS — Z00.00 MEDICARE ANNUAL WELLNESS VISIT, SUBSEQUENT: ICD-10-CM

## 2019-10-01 DIAGNOSIS — D12.6 BENIGN NEOPLASM OF COLON, UNSPECIFIED PART OF COLON: ICD-10-CM

## 2019-10-01 DIAGNOSIS — I10 ESSENTIAL HYPERTENSION: ICD-10-CM

## 2019-10-01 DIAGNOSIS — C43.61 MALIGNANT MELANOMA OF SKIN OF UPPER LIMB, INCLUDING SHOULDER, RIGHT (H): ICD-10-CM

## 2019-10-01 DIAGNOSIS — E11.9 TYPE 2 DIABETES MELLITUS (H): ICD-10-CM

## 2019-10-01 DIAGNOSIS — E78.2 MIXED HYPERLIPIDEMIA: ICD-10-CM

## 2019-10-01 DIAGNOSIS — M89.9 DISORDER OF BONE AND CARTILAGE: ICD-10-CM

## 2019-10-01 DIAGNOSIS — M48.061 SPINAL STENOSIS OF LUMBAR REGION WITHOUT NEUROGENIC CLAUDICATION: ICD-10-CM

## 2019-10-01 DIAGNOSIS — E11.8 CONTROLLED TYPE 2 DIABETES MELLITUS WITH COMPLICATION, WITHOUT LONG-TERM CURRENT USE OF INSULIN (H): ICD-10-CM

## 2019-10-01 DIAGNOSIS — K21.9 GASTROESOPHAGEAL REFLUX DISEASE WITHOUT ESOPHAGITIS: ICD-10-CM

## 2019-10-01 DIAGNOSIS — E03.9 HYPOTHYROIDISM: ICD-10-CM

## 2019-10-01 DIAGNOSIS — M94.9 DISORDER OF BONE AND CARTILAGE: ICD-10-CM

## 2019-10-01 DIAGNOSIS — E78.5 HYPERLIPIDEMIA: ICD-10-CM

## 2019-10-01 LAB
ALBUMIN SERPL-MCNC: 4.1 G/DL (ref 3.5–5)
ALBUMIN UR-MCNC: NEGATIVE MG/DL
ALP SERPL-CCNC: 79 U/L (ref 45–120)
ALT SERPL W P-5'-P-CCNC: 20 U/L (ref 0–45)
ANION GAP SERPL CALCULATED.3IONS-SCNC: 8 MMOL/L (ref 5–18)
APPEARANCE UR: CLEAR
AST SERPL W P-5'-P-CCNC: 17 U/L (ref 0–40)
BACTERIA #/AREA URNS HPF: ABNORMAL HPF
BILIRUB SERPL-MCNC: 0.4 MG/DL (ref 0–1)
BILIRUB UR QL STRIP: NEGATIVE
BUN SERPL-MCNC: 12 MG/DL (ref 8–28)
CALCIUM SERPL-MCNC: 9.9 MG/DL (ref 8.5–10.5)
CHLORIDE BLD-SCNC: 98 MMOL/L (ref 98–107)
CHOLEST SERPL-MCNC: 131 MG/DL
CO2 SERPL-SCNC: 28 MMOL/L (ref 22–31)
COLOR UR AUTO: YELLOW
CREAT SERPL-MCNC: 0.7 MG/DL (ref 0.6–1.1)
CREAT UR-MCNC: 66.9 MG/DL
ERYTHROCYTE [DISTWIDTH] IN BLOOD BY AUTOMATED COUNT: 11.6 % (ref 11–14.5)
FASTING STATUS PATIENT QL REPORTED: YES
GFR SERPL CREATININE-BSD FRML MDRD: >60 ML/MIN/1.73M2
GLUCOSE BLD-MCNC: 114 MG/DL (ref 70–125)
GLUCOSE UR STRIP-MCNC: NEGATIVE MG/DL
HBA1C MFR BLD: 6.5 % (ref 3.5–6)
HCT VFR BLD AUTO: 42.9 % (ref 35–47)
HDLC SERPL-MCNC: 46 MG/DL
HGB BLD-MCNC: 14.1 G/DL (ref 12–16)
HGB UR QL STRIP: ABNORMAL
KETONES UR STRIP-MCNC: NEGATIVE MG/DL
LDLC SERPL CALC-MCNC: 60 MG/DL
LEUKOCYTE ESTERASE UR QL STRIP: ABNORMAL
MCH RBC QN AUTO: 28.3 PG (ref 27–34)
MCHC RBC AUTO-ENTMCNC: 32.9 G/DL (ref 32–36)
MCV RBC AUTO: 86 FL (ref 80–100)
MICROALBUMIN UR-MCNC: 0.64 MG/DL (ref 0–1.99)
MICROALBUMIN/CREAT UR: 9.6 MG/G
NITRATE UR QL: NEGATIVE
PH UR STRIP: 7.5 [PH] (ref 5–8)
PLATELET # BLD AUTO: 246 THOU/UL (ref 140–440)
PMV BLD AUTO: 7.3 FL (ref 7–10)
POTASSIUM BLD-SCNC: 4.2 MMOL/L (ref 3.5–5)
PROT SERPL-MCNC: 6.9 G/DL (ref 6–8)
RBC # BLD AUTO: 4.99 MILL/UL (ref 3.8–5.4)
RBC #/AREA URNS AUTO: ABNORMAL HPF
SODIUM SERPL-SCNC: 134 MMOL/L (ref 136–145)
SP GR UR STRIP: 1.02 (ref 1–1.03)
SQUAMOUS #/AREA URNS AUTO: ABNORMAL LPF
TRIGL SERPL-MCNC: 125 MG/DL
TSH SERPL DL<=0.005 MIU/L-ACNC: 0.36 UIU/ML (ref 0.3–5)
UROBILINOGEN UR STRIP-ACNC: ABNORMAL
WBC #/AREA URNS AUTO: ABNORMAL HPF
WBC: 6 THOU/UL (ref 4–11)

## 2019-10-01 ASSESSMENT — MIFFLIN-ST. JEOR: SCORE: 1123.08

## 2019-10-01 ASSESSMENT — ANXIETY QUESTIONNAIRES
2. NOT BEING ABLE TO STOP OR CONTROL WORRYING: NOT AT ALL
1. FEELING NERVOUS, ANXIOUS, OR ON EDGE: NOT AT ALL

## 2019-10-02 ENCOUNTER — COMMUNICATION - HEALTHEAST (OUTPATIENT)
Dept: INTERNAL MEDICINE | Facility: CLINIC | Age: 84
End: 2019-10-02

## 2019-10-02 LAB — BACTERIA SPEC CULT: NO GROWTH

## 2019-10-10 ENCOUNTER — RECORDS - HEALTHEAST (OUTPATIENT)
Dept: ADMINISTRATIVE | Facility: OTHER | Age: 84
End: 2019-10-10

## 2019-11-25 ENCOUNTER — COMMUNICATION - HEALTHEAST (OUTPATIENT)
Dept: SCHEDULING | Facility: CLINIC | Age: 84
End: 2019-11-25

## 2019-11-25 DIAGNOSIS — J20.9 ACUTE BRONCHITIS, UNSPECIFIED ORGANISM: ICD-10-CM

## 2019-12-03 ENCOUNTER — COMMUNICATION - HEALTHEAST (OUTPATIENT)
Dept: INTERNAL MEDICINE | Facility: CLINIC | Age: 84
End: 2019-12-03

## 2019-12-03 DIAGNOSIS — E78.5 HYPERLIPIDEMIA: ICD-10-CM

## 2020-01-10 ENCOUNTER — RECORDS - HEALTHEAST (OUTPATIENT)
Dept: ADMINISTRATIVE | Facility: OTHER | Age: 85
End: 2020-01-10

## 2020-01-20 ENCOUNTER — HOSPITAL ENCOUNTER (OUTPATIENT)
Dept: MAMMOGRAPHY | Facility: CLINIC | Age: 85
Discharge: HOME | End: 2020-01-20
Attending: INTERNAL MEDICINE

## 2020-01-20 DIAGNOSIS — Z12.31 VISIT FOR SCREENING MAMMOGRAM: ICD-10-CM

## 2020-04-10 ENCOUNTER — RECORDS - HEALTHEAST (OUTPATIENT)
Dept: ADMINISTRATIVE | Facility: OTHER | Age: 85
End: 2020-04-10

## 2020-07-03 ENCOUNTER — COMMUNICATION - HEALTHEAST (OUTPATIENT)
Dept: INTERNAL MEDICINE | Facility: CLINIC | Age: 85
End: 2020-07-03

## 2020-07-03 DIAGNOSIS — E11.9 TYPE 2 DIABETES MELLITUS (H): ICD-10-CM

## 2020-07-15 ENCOUNTER — RECORDS - HEALTHEAST (OUTPATIENT)
Dept: ADMINISTRATIVE | Facility: OTHER | Age: 85
End: 2020-07-15

## 2020-08-14 ENCOUNTER — OFFICE VISIT - HEALTHEAST (OUTPATIENT)
Dept: INTERNAL MEDICINE | Facility: CLINIC | Age: 85
End: 2020-08-14

## 2020-08-14 DIAGNOSIS — I70.213 ATHEROSCLEROSIS OF NATIVE ARTERY OF BOTH LOWER EXTREMITIES WITH INTERMITTENT CLAUDICATION (H): ICD-10-CM

## 2020-08-14 DIAGNOSIS — I10 ESSENTIAL HYPERTENSION: ICD-10-CM

## 2020-08-14 DIAGNOSIS — E78.2 MIXED HYPERLIPIDEMIA: ICD-10-CM

## 2020-08-14 DIAGNOSIS — D69.6 THROMBOCYTOPENIA, UNSPECIFIED (H): ICD-10-CM

## 2020-08-14 DIAGNOSIS — R60.0 PERIPHERAL EDEMA: ICD-10-CM

## 2020-08-14 DIAGNOSIS — E11.8 CONTROLLED TYPE 2 DIABETES MELLITUS WITH COMPLICATION, WITHOUT LONG-TERM CURRENT USE OF INSULIN (H): ICD-10-CM

## 2020-08-14 LAB
ANION GAP SERPL CALCULATED.3IONS-SCNC: 9 MMOL/L (ref 5–18)
BUN SERPL-MCNC: 11 MG/DL (ref 8–28)
CALCIUM SERPL-MCNC: 10 MG/DL (ref 8.5–10.5)
CHLORIDE BLD-SCNC: 101 MMOL/L (ref 98–107)
CHOLEST SERPL-MCNC: 142 MG/DL
CO2 SERPL-SCNC: 28 MMOL/L (ref 22–31)
CREAT SERPL-MCNC: 0.7 MG/DL (ref 0.6–1.1)
ERYTHROCYTE [DISTWIDTH] IN BLOOD BY AUTOMATED COUNT: 12.7 % (ref 11–14.5)
FASTING STATUS PATIENT QL REPORTED: YES
GFR SERPL CREATININE-BSD FRML MDRD: >60 ML/MIN/1.73M2
GLUCOSE BLD-MCNC: 108 MG/DL (ref 70–125)
HBA1C MFR BLD: 6.3 %
HCT VFR BLD AUTO: 43.2 % (ref 35–47)
HDLC SERPL-MCNC: 44 MG/DL
HGB BLD-MCNC: 14.2 G/DL (ref 12–16)
LDLC SERPL CALC-MCNC: 71 MG/DL
MCH RBC QN AUTO: 28 PG (ref 27–34)
MCHC RBC AUTO-ENTMCNC: 32.8 G/DL (ref 32–36)
MCV RBC AUTO: 85 FL (ref 80–100)
PLATELET # BLD AUTO: 227 THOU/UL (ref 140–440)
PMV BLD AUTO: 7.8 FL (ref 7–10)
POTASSIUM BLD-SCNC: 4.4 MMOL/L (ref 3.5–5)
RBC # BLD AUTO: 5.06 MILL/UL (ref 3.8–5.4)
SODIUM SERPL-SCNC: 138 MMOL/L (ref 136–145)
TRIGL SERPL-MCNC: 135 MG/DL
WBC: 5.5 THOU/UL (ref 4–11)

## 2020-08-17 ENCOUNTER — COMMUNICATION - HEALTHEAST (OUTPATIENT)
Dept: INTERNAL MEDICINE | Facility: CLINIC | Age: 85
End: 2020-08-17

## 2020-10-05 ENCOUNTER — COMMUNICATION - HEALTHEAST (OUTPATIENT)
Dept: INTERNAL MEDICINE | Facility: CLINIC | Age: 85
End: 2020-10-05

## 2020-10-05 DIAGNOSIS — I10 ESSENTIAL HYPERTENSION, BENIGN: ICD-10-CM

## 2020-10-05 DIAGNOSIS — I10 ESSENTIAL HYPERTENSION: ICD-10-CM

## 2020-10-06 ENCOUNTER — COMMUNICATION - HEALTHEAST (OUTPATIENT)
Dept: SCHEDULING | Facility: CLINIC | Age: 85
End: 2020-10-06

## 2020-10-12 ENCOUNTER — COMMUNICATION - HEALTHEAST (OUTPATIENT)
Dept: INTERNAL MEDICINE | Facility: CLINIC | Age: 85
End: 2020-10-12

## 2020-10-12 DIAGNOSIS — I10 HYPERTENSION: ICD-10-CM

## 2020-10-27 ENCOUNTER — COMMUNICATION - HEALTHEAST (OUTPATIENT)
Dept: INTERNAL MEDICINE | Facility: CLINIC | Age: 85
End: 2020-10-27

## 2020-10-27 DIAGNOSIS — E78.5 HYPERLIPIDEMIA: ICD-10-CM

## 2020-11-05 ENCOUNTER — RECORDS - HEALTHEAST (OUTPATIENT)
Dept: ADMINISTRATIVE | Facility: OTHER | Age: 85
End: 2020-11-05

## 2020-11-06 ENCOUNTER — VIRTUAL VISIT (OUTPATIENT)
Dept: FAMILY MEDICINE | Facility: OTHER | Age: 85
End: 2020-11-06

## 2020-11-06 NOTE — PROGRESS NOTES
"Date: 2020 12:21:57  Clinician: Berkley Bravo  Clinician NPI: [id number]  Patient: Abril Pinto  Patient : 1933-10-17  Patient Address: [facility] [place] [address]  Patient Phone: [phone]  Visit Protocol: URI  Patient Summary:  Abril Mario is a 87 year old ( : 1933-10-17 ) female who initiated a [facility] Visit for COVID-19 (Coronavirus) evaluation and screening. When asked the question \"Please sign me up to receive news, health information and promotions. \", Abril Mario responded \"No\".    When asked when her symptoms started, Abril Mario reported that she does not have any symptoms.   She denies taking antibiotic medication in the past month and having recent facial or sinus surgery in the past 60 days.    Pertinent COVID-19 (Coronavirus) information  Abril Mario does not work or volunteer as healthcare worker or a . In the past 14 days, Abril Mario has not worked or volunteered at a healthcare facility or group living setting.   In the past 14 days, she also has not lived in a congregate living setting.   Abril Mario has had a close contact with a laboratory-confirmed COVID-19 patient in the last 14 days. She was not exposed at her work. Date Abril Mario was exposed to the laboratory-confirmed COVID-19 patient: 2020   Additional information about contact with COVID-19 (Coronavirus) patient as reported by the patient (free text): In a garage with the door open   Abril Mario is not living in the same household with the COVID-19 positive patient. She was in an enclosed space for greater than 15 minutes with the COVID-19 patient.   During the encounter, neither were wearing masks.   Since 2019, Abril Mario has not been tested for COVID-19 and has not had upper respiratory infection or influenza-like illness.   Pertinent medical history  Abril Mario does not get yeast infections when she takes antibiotics.   Abril Mario does not need a return to work/school note.   Weight: 157 lbs   Abril Mario does " not smoke or use smokeless tobacco.   Weight: 157 lbs    MEDICATIONS: Cholesterol Relief oral, Thyrolar-1 oral, ALLERGIES: NKDA  Clinician Response:  Dear Abril Mario,   Your symptoms show that you may have coronavirus (COVID-19). This illness can cause fever, cough and trouble breathing. Many people get a mild case and get better on their own. Some people can get very sick.  What should I do?  We would like to test you for this virus.   1. Please call [phone] to schedule your visit. Explain that you were referred by [facility] to have a COVID-19 test. Be ready to [facility].  * If you need to schedule in [facility] please call [phone] or for [employer] employees please call [phone].  * If you need to schedule in the [place] area please call [phone]. [place] employees call [phone].  The following will serve as your written order for this COVID Test, ordered by me, for the indication of suspected COVID [Z20.828]: The test will be ordered in [employer], our electronic health record, after you are scheduled. It will show as ordered and authorized by Avery Spann MD.  Order: COVID-19 (Coronavirus) PCR for SYMPTOMATIC testing from [facility].   2. When it's time for your COVID test:  Stay at least 6 feet away from others. (If someone will drive you to your test, stay in the backseat, as far away from the  as you can.)   Cover your mouth and nose with a mask, tissue or washcloth.  Go straight to the testing site. Don't make any stops on the way there or back.      3.Starting now: Stay home and away from others (self-isolate) until:   You've had no fever---and no medicine that reduces fever---for one full day (24 hours). And...   Your other symptoms have gotten better. For example, your cough or breathing has improved. And...   At least 10 days have passed since your symptoms started.       During this time, don't leave the house except for testing or medical care.   Stay in your own  "room, even for meals. Use your own bathroom if you can.   Stay away from others in your home. No hugging, kissing or shaking hands. No visitors.  Don't go to work, school or anywhere else.    Clean \"high touch\" surfaces often (doorknobs, counters, handles, etc.). Use a household cleaning spray or wipes. You'll find a full list of  on the EPA website: www.epa.gov/pesticide-registration/list-n-disinfectants-use-against-sars-cov-2.   Cover your mouth and nose with a mask, tissue or washcloth to avoid spreading germs.  Wash your hands and face often. Use soap and water.  Caregivers in these groups are at risk for severe illness due to COVID-19:  o People 65 years and older  o People who live in a nursing home or long-term care facility  o People with chronic disease (lung, heart, cancer, diabetes, kidney, liver, immunologic)  o People who have a weakened immune system, including those who:   Are in cancer treatment  Take medicine that weakens the immune system, such as corticosteroids  Had a bone marrow or organ transplant  Have an immune deficiency  Have poorly controlled HIV or AIDS  Are obese (body mass index of 40 or higher)  Smoke regularly   o Caregivers should wear gloves while washing dishes, handling laundry and cleaning bedrooms and bathrooms.  o Use caution when washing and drying laundry: Don't shake dirty laundry, and use the warmest water setting that you can.  o For more tips, go to www.cdc.gov/coronavirus/2019-ncov/downloads/10Things.pdf.    4.Sign up for [employer]. We know it's scary to hear that you might have COVID-19. We want to track your symptoms to make sure you're okay over the next 2 weeks. Please look for an email from [employer]---this is a free, online program that we'll use to keep in touch. To sign up, follow the link in the email. Learn more at [email or web address]  How can I take care of myself?   Get lots of rest. Drink extra fluids (unless a doctor has told you " not to).   Take Tylenol (acetaminophen) for fever or pain. If you have liver or kidney problems, ask your family doctor if it's okay to take Tylenol.   Adults can take either:    650 mg (two 325 mg pills) every 4 to 6 hours, or...   1,000 mg (two 500 mg pills) every 8 hours as needed.    Note: Don't take more than 3,000 mg in one day. Acetaminophen is found in many medicines (both prescribed and over-the-counter medicines). Read all labels to be sure you don't take too much.   For children, check the Tylenol bottle for the right dose. The dose is based on the child's age or weight.    If you have other health problems (like cancer, heart failure, an organ transplant or severe kidney disease): Call your specialty clinic if you don't feel better in the next 2 days.       Know when to call 911. Emergency warning signs include:    Trouble breathing or shortness of breath Pain or pressure in the chest that doesn't go away Feeling confused like you haven't felt before, or not being able to wake up Bluish-colored lips or face.  Where can I get more information?   [facility] -- About COVID-19: [email or web address]   CDC -- What to Do If You're Sick: www.cdc.gov/coronavirus/2019-ncov/about/steps-when-sick.html   CDC -- Ending Home Isolation: www.cdc.gov/coronavirus/2019-ncov/hcp/disposition-in-home-patients.html   CDC -- Caring for Someone: www.cdc.gov/coronavirus/2019-ncov/if-you-are-sick/care-for-someone.html   [facility] -- Interim Guidance for Hospital Discharge to Home: www.health.[facility].mn.us/diseases/coronavirus/hcp/hospdischarge.pdf   [facility] clinical trials (COVID-19 research studies): [email or web address]    Below are the COVID-19 hotlines at the [place] Department of Health ([facility]). Interpreters are available.    For health questions: Call 353-038-5411 or 1-407.837.4521 (7 a.m. to 7 p.m.) For questions about schools and childcare: Call 104-583-8596 or  [phone] (7 a.m. to 7 p.m.)    Diagnosis: Cough  Diagnosis ICD: R05

## 2020-11-08 DIAGNOSIS — Z20.822 SUSPECTED COVID-19 VIRUS INFECTION: Primary | ICD-10-CM

## 2020-11-09 ENCOUNTER — OFFICE VISIT (OUTPATIENT)
Dept: URGENT CARE | Facility: URGENT CARE | Age: 85
End: 2020-11-09
Payer: COMMERCIAL

## 2020-11-09 DIAGNOSIS — Z20.822 SUSPECTED COVID-19 VIRUS INFECTION: ICD-10-CM

## 2020-11-09 PROCEDURE — 99207 PR NO CHARGE NURSE ONLY: CPT

## 2020-11-09 PROCEDURE — U0003 INFECTIOUS AGENT DETECTION BY NUCLEIC ACID (DNA OR RNA); SEVERE ACUTE RESPIRATORY SYNDROME CORONAVIRUS 2 (SARS-COV-2) (CORONAVIRUS DISEASE [COVID-19]), AMPLIFIED PROBE TECHNIQUE, MAKING USE OF HIGH THROUGHPUT TECHNOLOGIES AS DESCRIBED BY CMS-2020-01-R: HCPCS | Performed by: FAMILY MEDICINE

## 2020-11-10 LAB
SARS-COV-2 RNA SPEC QL NAA+PROBE: NOT DETECTED
SPECIMEN SOURCE: NORMAL

## 2020-11-13 ENCOUNTER — COMMUNICATION - HEALTHEAST (OUTPATIENT)
Dept: SCHEDULING | Facility: CLINIC | Age: 85
End: 2020-11-13

## 2020-12-28 ENCOUNTER — COMMUNICATION - HEALTHEAST (OUTPATIENT)
Dept: INTERNAL MEDICINE | Facility: CLINIC | Age: 85
End: 2020-12-28

## 2020-12-28 DIAGNOSIS — E03.9 HYPOTHYROIDISM: ICD-10-CM

## 2020-12-28 DIAGNOSIS — K21.9 GASTROESOPHAGEAL REFLUX DISEASE WITHOUT ESOPHAGITIS: ICD-10-CM

## 2020-12-31 ENCOUNTER — COMMUNICATION - HEALTHEAST (OUTPATIENT)
Dept: INTERNAL MEDICINE | Facility: CLINIC | Age: 85
End: 2020-12-31

## 2020-12-31 DIAGNOSIS — E78.5 HYPERLIPIDEMIA: ICD-10-CM

## 2021-02-05 ENCOUNTER — RECORDS - HEALTHEAST (OUTPATIENT)
Dept: ADMINISTRATIVE | Facility: OTHER | Age: 86
End: 2021-02-05

## 2021-02-15 ENCOUNTER — AMBULATORY - HEALTHEAST (OUTPATIENT)
Dept: NURSING | Facility: CLINIC | Age: 86
End: 2021-02-15

## 2021-03-08 ENCOUNTER — AMBULATORY - HEALTHEAST (OUTPATIENT)
Dept: NURSING | Facility: CLINIC | Age: 86
End: 2021-03-08

## 2021-05-26 ENCOUNTER — RECORDS - HEALTHEAST (OUTPATIENT)
Dept: ADMINISTRATIVE | Facility: CLINIC | Age: 86
End: 2021-05-26

## 2021-05-27 ENCOUNTER — RECORDS - HEALTHEAST (OUTPATIENT)
Dept: ADMINISTRATIVE | Facility: CLINIC | Age: 86
End: 2021-05-27

## 2021-05-28 NOTE — TELEPHONE ENCOUNTER
Refill Approved    Rx renewed per Medication Renewal Policy. Medication was last renewed on 9/25/18 & 5/11/18.    Last office visit 3/20/19    [facility], [facility] Triage/Med Refill 5/2/2019     Requested Prescriptions   Pending Prescriptions Disp Refills     metoprolol succinate (TOPROL-XL) 25 MG [Pharmacy Med Name: METOPROLOL ER 25MG  TAB] 90 tablet 1     Sig: TAKE 1 TABLET BY MOUTH ONCE DAILY       Beta-Blockers Refill Protocol Passed - 5/1/2019  3:10 PM        Passed - PCP or prescribing provider visit in past 12 months or next 3 months     Last office visit with prescriber/PCP: 11/23/2018 Beltran Walker MD OR same dept: 3/20/2019 Aayush Velásquez MD OR same specialty: 3/20/2019 Aayush Velásquez MD  Last physical: 11/7/2017 Last MTM visit: Visit date not found   Next visit within 3 mo: Visit date not found  Next physical within 3 mo: Visit date not found  Prescriber OR PCP: Beltran Walker MD  Last diagnosis associated with med order: 1. Hypertension  - metoprolol succinate (TOPROL-XL) 25 MG [Pharmacy Med Name: METOPROLOL ER 25MG  TAB]; TAKE 1 TABLET BY MOUTH ONCE DAILY  Dispense: 90 tablet; Refill: 1    2. Type 2 diabetes mellitus (H)  - metFORMIN (GLUCOPHAGE) 500 MG tablet [Pharmacy Med Name: METFORMIN 500MG TAB]; TAKE 1 TABLET BY MOUTH TWICE DAILY WITH MEALS  Dispense: 180 tablet; Refill: 1    If protocol passes may refill for 12 months if within 3 months of last provider visit (or a total of 15 months).             Passed - Blood pressure filed in past 12 months     BP Readings from Last 1 Encounters:   03/20/19 124/60             metFORMIN (GLUCOPHAGE) 500 MG tablet [Pharmacy Med Name: METFORMIN 500MG TAB] 180 tablet 1     Sig: TAKE 1 TABLET BY MOUTH TWICE DAILY WITH MEALS       Metformin Refill Protocol Passed - 5/1/2019  3:10 PM        Passed - Blood pressure in last 12 months     BP Readings from Last 1 Encounters:   03/20/19 124/60             Passed - LFT or AST or ALT in last 12 months      Albumin   Date Value Ref Range Status   11/23/2018 4.1 3.5 - 5.0 g/dL Final     Bilirubin, Total   Date Value Ref Range Status   11/23/2018 0.4 0.0 - 1.0 mg/dL Final     Bilirubin, Direct   Date Value Ref Range Status   12/16/2010 0.1 <0.6 mg/dL Final     Alkaline Phosphatase   Date Value Ref Range Status   11/23/2018 81 45 - 120 U/L Final     AST   Date Value Ref Range Status   11/23/2018 22 0 - 40 U/L Final     ALT   Date Value Ref Range Status   11/23/2018 27 0 - 45 U/L Final     Protein, Total   Date Value Ref Range Status   11/23/2018 7.2 6.0 - 8.0 g/dL Final                Passed - GFR or Serum Creatinine in last 6 months     GFR MDRD Non Af Amer   Date Value Ref Range Status   11/23/2018 >60 >60 mL/min/1.73m2 Final     GFR MDRD Af Amer   Date Value Ref Range Status   11/23/2018 >60 >60 mL/min/1.73m2 Final             Passed - Visit with PCP or prescribing provider visit in last 6 months or next 3 months     Last office visit with prescriber/PCP: 11/23/2018 OR same dept: 3/20/2019 Aayush Velásquez MD OR same specialty: 3/20/2019 Aayush Velásquez MD Last physical: Visit date not found Last MTM visit: Visit date not found         Next appt within 3 mo: Visit date not found  Next physical within 3 mo: Visit date not found  Prescriber OR PCP: Beltran Walker MD  Last diagnosis associated with med order: 1. Hypertension  - metoprolol succinate (TOPROL-XL) 25 MG [Pharmacy Med Name: METOPROLOL ER 25MG  TAB]; TAKE 1 TABLET BY MOUTH ONCE DAILY  Dispense: 90 tablet; Refill: 1    2. Type 2 diabetes mellitus (H)  - metFORMIN (GLUCOPHAGE) 500 MG tablet [Pharmacy Med Name: METFORMIN 500MG TAB]; TAKE 1 TABLET BY MOUTH TWICE DAILY WITH MEALS  Dispense: 180 tablet; Refill: 1     If protocol passes may refill for 12 months if within 3 months of last provider visit (or a total of 15 months).           Passed - A1C in last 6 months     Hemoglobin A1c   Date Value Ref Range Status   11/23/2018 6.9 (H) 3.5 - 6.0 % Final                Passed - Microalbumin in last year      Microalbumin, Random Urine   Date Value Ref Range Status   11/23/2018 <0.50 0.00 - 1.99 mg/dL Final

## 2021-05-29 ENCOUNTER — RECORDS - HEALTHEAST (OUTPATIENT)
Dept: ADMINISTRATIVE | Facility: CLINIC | Age: 86
End: 2021-05-29

## 2021-05-30 ENCOUNTER — RECORDS - HEALTHEAST (OUTPATIENT)
Dept: ADMINISTRATIVE | Facility: CLINIC | Age: 86
End: 2021-05-30

## 2021-05-30 VITALS — HEIGHT: 62 IN | WEIGHT: 176.2 LBS | BODY MASS INDEX: 32.42 KG/M2

## 2021-05-30 NOTE — PROGRESS NOTES
[facility] Daily Progress     Patient Name: Abril Pinto  Date: 2019  Visit #:      auth to 10/7/19  PTA visit #:  1  Referral Diagnosis: Chronic midline low back pain with left-sided sciatica  Referring provider: Beltran Walker MD  Visit Diagnosis:     ICD-10-CM    1. Chronic midline low back pain with left-sided sciatica M54.42     G89.29    2. Left leg weakness R29.898    3. Decreased ROM of lumbar spine M53.86    4. Decreased range of motion of left lower extremity M25.662          Assessment:     Cues for HEP/posture needed  Complaint with HEP  Pt would benefit with continuing skilled physical therapy treatments    Goal Status:  Ongoing  Pt. will demonstrate/verbalize independence in self-management of condition in : 12 weeks  Pt. will be independent with home exercise program in : 12 weeks  Pt. will report decreased intensity, frequency of : in 12 weeks  Pt. will be able to walk : 30 minutes;with less pain;with less difficulty;for community mobility;in 12 weeks  Patient will transfer: sit/stand;for in/out of chair;with less pain;in 12 weeks    Pt will: be able to carry groceries into house with less pain and difficulty      Plan / Patient Education:     Continue POC  Plan for next visit: review exercises, Continue with MT to left lower back and left buttock region,  standing hip extension, abd, and flexion of left LE  Subjective:     Pain Ratin/10 right sided back pain c/o with seated HS stretch  alieve this morning  Sleeping is good  Walking is difffculty with tightness in calves  Ex 2x/day      Objective:     Reviewed HEP/posture cues needed  Added clams 10x mary  Continued MT left SL per flow sheet  Tolerated treatment well    Treatment Today  2019    TREATMENT MINUTES COMMENTS   Evaluation     Self-care/ Home management     Manual therapy 10 Left lumbar paraspinals and left gluteal muscle STM in right sidelying x10' with moderate pressure   Neuromuscular Re-education      Therapeutic Activity     Therapeutic Exercises 13 Ex flow sheet cues needed   Gait training     Modality__________________                Total 23    Blank areas are intentional and mean the treatment did not include these items.       Pillo Power  7/11/2019

## 2021-05-30 NOTE — PROGRESS NOTES
ASSESSMENT:  1. Controlled type 2 diabetes mellitus with complication, without long-term current use of insulin (H)  She is on metformin.  Overall diabetic control is excellent with a hemoglobin A1c of 6.4  - Glycosylated Hemoglobin A1c  - Microalbumin, Random Urine  - Basic Metabolic Panel    2. Osteopenia  Bone density was good for age when checked in August 2016.  I feel she could wait another year or 2 before rechecking    3. Acquired hypothyroidism  TSH was elevated when last checked.  This will be checked again today  - Thyroid Stimulating Hormone (TSH)    4. Chronic midline low back pain with left-sided sciatica  She was referred to the spine clinic last winter.  Was supposed to be referred to physical therapy but this never occurred.  Is still noting back discomfort.  Physical therapy referral will be authorized.  Her CT of the back from 12/3/2018 was reviewed.  - Ambulatory referral to Physical Therapy    5. Pain of left lower extremity  Has a history of peripheral vascular disease with stenting in the right femoral area in 2014.  Would be concerned about developing disease on the left  - US Arterial Legs Bilateral Complete; Future    6. Peripheral vascular disease (H)  See above  - US Arterial Legs Bilateral Complete; Future    7.  Hypercholesterolemia  She is treated with pravastatin and Zetia.  She had a past history of poor tolerance to simvastatin due to muscle aches    PLAN:  Patient Instructions   1..  Schedule arterial ultrasound of legs.    2. Schedule physical therapy for low back pain.    3.  I will notify you of test results    4.  See in three to four months (November). Annual wellness visit.      Orders Placed This Encounter   Procedures     US Arterial Legs Bilateral Complete     Standing Status:   Future     Standing Expiration Date:   7/1/2020     Order Specific Question:   Reason for Exam (Describe Symptoms):     Answer:   leg pain at rest and with activity     Order Specific Question:    Can the procedure be changed per Radiologist protocol?     Answer:   Yes     Order Specific Question:   If this test is abnormal would you like a consult at the [facility]?     Answer:   No     Glycosylated Hemoglobin A1c     Microalbumin, Random Urine     Basic Metabolic Panel     Thyroid Stimulating Hormone (TSH)     Ambulatory referral to Physical Therapy     Referral Priority:   Routine     Referral Type:   Physical Therapy     Referral Reason:   Evaluation and Treatment     Requested Specialty:   Physical Therapy     Number of Visits Requested:   1     There are no discontinued medications.    Return in about 3 months (around 10/1/2019) for Annual physical.      ASSESSED PROBLEMS:  1. Osteopenia     2. Controlled type 2 diabetes mellitus with complication, without long-term current use of insulin (H)  Glycosylated Hemoglobin A1c    Microalbumin, Random Urine    Basic Metabolic Panel   3. Acquired hypothyroidism  Thyroid Stimulating Hormone (TSH)   4. Chronic midline low back pain with left-sided sciatica  Ambulatory referral to Physical Therapy   5. Pain of left lower extremity  US Arterial Legs Bilateral Complete   6. Peripheral vascular disease (H)  US Arterial Legs Bilateral Complete       CHIEF COMPLAINT:  Chief Complaint   Patient presents with     Diabetes     Due A1C, pend the order     Leg Swelling     X three months of intermittent legt leg swelling and feels numb       HISTORY OF PRESENT ILLNESS:  Abril Mario is a 85 y.o. female presenting to the clinic today to follow up on diabetes and with complaints of leg cramping.     Leg cramping: She has been experiencing a numbness and occasional cramping of her left lower leg. When the leg cramps, it seems to flex inward. She notices it the most when she is in bed and will have to get up and walk around until it goes away. She occasionally gets cramps in her right leg as well but not as severe as in the left. She has some pain in her legs with walking, and  "she thinks some of it is related to her spine. She was seen at the spine center once last fall, and they were going to send her to physical therapy, but she never heard from them. She feels the numbness in her leg all the time and would be interested in making appointments with therapy now.     Diabetes: Her last hemoglobin A1c was 6.9% on 11/23/2018, and she will have one checked today. She last had her eyes checked in February.     Health Maintenance: Her DXA scan in 2016 showed normal bone density.     REVIEW OF SYSTEMS:   She has significant pain in a toe on her left foot. She tolerates her medications well. She is not fasting today. All other systems are negative.    PFSH:  Reviewed, as below.     TOBACCO USE:  Social History     Tobacco Use   Smoking Status Former Smoker     Years: 10.00     Types: Cigarettes   Smokeless Tobacco Never Used       VITALS:  Vitals:    07/01/19 1332 07/01/19 1342   BP: 146/60 130/50   Patient Site: Left Arm Left Arm   Patient Position: Sitting Sitting   Cuff Size: Adult Large Adult Regular   Pulse: 86    SpO2: 97%    Weight: 166 lb (75.3 kg)    Height: 5' 2\" (1.575 m)      Wt Readings from Last 3 Encounters:   07/01/19 166 lb (75.3 kg)   03/20/19 164 lb (74.4 kg)   12/19/18 167 lb (75.8 kg)     Body mass index is 30.36 kg/m .    PHYSICAL EXAM:  Constitutional:   Reveals an alert, talkative woman. Does not appear acutely ill.  Vitals: per nursing notes.  HEENT:  Ears:  External canals, TMs clear.    Eyes:  EOMs full, PERRL.  Oropharynx:   Mouth and throat clear, no thrush or exudate.  Neck:  Supple, no carotid bruits, thyromegaly, or adenopathy.  Back:  No abnormal kyphosis.   Thorax:  No bony deformities.  Lungs: Clear to A&P without rales or wheezes.  Respiratory effort normal.  Cardiac:   Regular rate and rhythm, normal S1, S2, no murmur or gallop.  Abdomen:  Soft, moderately obese, no bruits, mass, or tenderness. Right inguinal scar. Right femoral pulses palpable without bruit. " Left femoral pulses not palpable.  Extremities:  Minor stasis changes lower legs. Left foot warm, DP and PT pulses not palpated. Pulses in right foot palpable but diminished. Capillary filling in toes normal. No foot ulcers.   Psychiatric:  Memory intact, mood appropriate.    ADDITIONAL HISTORY SUMMARIZED (2): Reviewed December 2019 spine clinic note regarding back pain   DECISION TO OBTAIN EXTRA INFORMATION (1): None.   RADIOLOGY TESTS (1): Reviewed 2016 DXA showing normal bone density. US ordered.   LABS (1): Labs from 11/23/2018 reviewed. Labs ordered.   MEDICINE TESTS (1): None.  INDEPENDENT REVIEW (2 each): None.     The visit lasted a total of 16 minutes face to face with the patient. Over 50% of the time was spent counseling and educating the patient about her leg cramps.    IRudolph, am scribing for and in the presence of, Dr. Walker.    IBeltran, personally performed the services described in this documentation, as scribed by Rudolph Mendoza in my presence, and it is both accurate and complete.    Dragon dictation was used for this note.  Speech recognition errors are a possibility.    MEDICATIONS:  Current Outpatient Medications   Medication Sig Dispense Refill     amLODIPine (NORVASC) 10 MG tablet Take 10 mg by mouth daily.       calcium, as carbonate, (OS-ROLA) 500 mg calcium (1,250 mg) tablet Take 1 tablet by mouth daily.       cholecalciferol, vitamin D3, 1,000 unit tablet Take 1,000 Units by mouth daily.       CONTOUR NEXT STRIPS strips Use 1 each As Directed daily. 50 strip 11     ezetimibe (ZETIA) 10 mg tablet Take 10 mg by mouth daily.       generic lancets (MICROLET LANCET) Use 1 each As Directed daily. 100 each 3     levothyroxine (SYNTHROID, LEVOTHROID) 150 MCG tablet TAKE ONE TABLET BY MOUTH ONCE DAILY 90 tablet 3     lisinopril-hydrochlorothiazide (PRINZIDE,ZESTORETIC) 20-25 mg per tablet Take 1 tablet by mouth daily.       metFORMIN (GLUCOPHAGE) 500 MG tablet TAKE 1 TABLET BY  MOUTH TWICE DAILY WITH MEALS 180 tablet 1     metoprolol succinate (TOPROL-XL) 25 MG TAKE 1 TABLET BY MOUTH ONCE DAILY 90 tablet 1     omeprazole (PRILOSEC) 20 MG capsule Take 20 mg by mouth daily before breakfast.       potassium gluconate 550 mg (90 mg) tablet Take 90 mg by mouth daily.       pravastatin (PRAVACHOL) 40 MG tablet Take 40 mg by mouth daily.       No current facility-administered medications for this visit.        Total data points: 4

## 2021-05-30 NOTE — PATIENT INSTRUCTIONS - HE
1..  Schedule arterial ultrasound of legs.    2. Schedule physical therapy for low back pain.    3.  I will notify you of test results    4.  See in three to four months (November). Annual wellness visit.

## 2021-05-30 NOTE — PROGRESS NOTES
[facility] Daily Progress     Patient Name: Abril Pinto  Date: 2019  Visit #:      auth to 10/7/19  PTA visit #:  2  Referral Diagnosis: Chronic midline low back pain with left-sided sciatica  Referring provider: Beltran Walker MD  Visit Diagnosis:     ICD-10-CM    1. Chronic midline low back pain with left-sided sciatica M54.42     G89.29    2. Left leg weakness R29.898    3. Decreased ROM of lumbar spine M53.86    4. Decreased range of motion of left lower extremity M25.662    5. Numbness of left lower extremity R20.0          Assessment:     Reports some decreased pain and feeling better  Walking is tolerated x 15 minutes as long as she goes slowly  Complaint with HEP  Pt would benefit with continuing skilled physical therapy treatments  Decreased ROM bilateral hips and lumbar spine and generalized weakness of trunk and lower extremities    Goal Status:  Ongoing  Pt. will demonstrate/verbalize independence in self-management of condition in : Progressing toward  Pt. will be independent with home exercise program in : Progressing toward  Pt. will report decreased intensity, frequency of : Progressing toward  Pt. will be able to walk : 30 minutes;with less pain;with less difficulty;for community mobility;in 12 weeks  Patient will transfer: sit/stand;for in/out of chair;with less pain;in 12 weeks;Progressing toward    Pt will: be able to carry groceries into house with less pain and difficulty      Plan / Patient Education:     Continue POC  Plan for next visit: review exercises, Continue with MT to paraspinals of lower back, review new ex and add hip extension and mini squats  Subjective:     Pain Ratin/10  Bilateral low back pain  No leg pain  Geronimo horse in leg last night, reports not drinking as much water as she should  No aleve this morning  Sleeping is good except woke with Geronimo horse  Walking not daily yet: is difffculty with tightness in calves slowly I can go 15-20 mins  now  Ex 2x/day      Objective:     Slow gait  Hip abduction -3/5 MMT mary  Hamstring tightness bilateal    Lumbar Flexion: finger tips to shin  Extension: major loss   Right side bend mod loss with right  Left side bend with mod loss with less pain in midline    Treatment Today  7/25/2019    TREATMENT MINUTES COMMENTS   Evaluation     Self-care/ Home management  Discussed using footstool when ironing for one LE and also discussed possible use of back support for prolonged standing activity   Manual therapy 15 Bilateral lumbar paraspinals STM prone over 2 pillows x10' with moderate pressure   Neuromuscular Re-education     Therapeutic Activity     Therapeutic Exercises 10 Ex per flow sheet   Gait training     Modality__________________                Total 25    Blank areas are intentional and mean the treatment did not include these items.       Tahira Escobar, PT  7/25/2019

## 2021-05-30 NOTE — PROGRESS NOTES
[facility] Certification Request    July 9, 2019      Patient: Abril Pinto  MR Number: [id number]  YOB: 1933  Date of Visit: 7/9/2019      Dear Dr. Beltran Walker:    Thank you for this referral.   We are seeing Abril Pinto for Physical Therapy of Chronic midline low back pain with left sided sciatica.    Medicare and/or Medicaid requires physician review and approval of the treatment plan. Please review the plan of care and verify that you agree with the therapy plan of care by co-signing this note.      Plan of Care  Authorization / Certification Start Date: 07/09/19  Authorization / Certification End Date: 10/07/19  Communication with: Referral Source  Patient Related Instruction: Nature of Condition;Treatment plan and rationale;Self Care instruction;Basis of treatment;Body mechanics;Posture;Next steps;Expected outcome;Precautions  Times per Week: 1-2  Number of Weeks: 4-6  Number of Visits: 6-12  Precautions / Restrictions : DM2, osteopenia, PVD with stent right femoral artery  Therapeutic Exercise: ROM;Stretching;Strengthening  Neuromuscular Reeducation: kinesio tape;posture;core  Manual Therapy: soft tissue mobilization  Modalities: electrical stimulation;cold pack      Goals:  Pt. will demonstrate/verbalize independence in self-management of condition in : 12 weeks  Pt. will be independent with home exercise program in : 12 weeks  Pt. will report decreased intensity, frequency of : in 12 weeks  Pt. will be able to walk : 30 minutes;with less pain;with less difficulty;for community mobility;in 12 weeks  Patient will transfer: sit/stand;for in/out of chair;with less pain;in 12 weeks    Pt will: be able to carry groceries into house with less pain and difficulty        If you have any questions or concerns, please don't hesitate to call.    Sincerely,      Tahira Escobar, PT        Physician recommendation:     ___ Follow therapist's recommendation        ___ Modify  therapy      *Physician co-signature indicates they certify the need for these services furnished within this plan and while under their care.    [facility]   [facility] Initial Evaluation    Patient Name: Abril Pinto  Date of evaluation: 7/9/2019  Referral Diagnosis: Chronic midline low back pain with left-sided sciatica  Referring provider: Beltran Walker MD  Visit Diagnosis:     ICD-10-CM    1. Chronic midline low back pain with left-sided sciatica M54.42     G89.29    2. Left leg weakness R29.898    3. Decreased ROM of lumbar spine M53.86    4. Decreased range of motion of left lower extremity M25.662    5. Numbness of left lower extremity R20.0        Assessment:      Pt. is appropriate for skilled PT intervention as outlined in the Plan of Care (POC).  Pt. is a good candidate for skilled PT services to improve pain levels and function.    Goals:  Pt. will demonstrate/verbalize independence in self-management of condition in : 12 weeks  Pt. will be independent with home exercise program in : 12 weeks  Pt. will report decreased intensity, frequency of : in 12 weeks  Pt. will be able to walk : 30 minutes;with less pain;with less difficulty;for community mobility;in 12 weeks  Patient will transfer: sit/stand;for in/out of chair;with less pain;in 12 weeks    Pt will: be able to carry groceries into house with less pain and difficulty      Patient's expectations/goals are realistic.    Barriers to Learning or Achieving Goals:  Chronicity of the problem.       Plan / Patient Instructions:        Plan of Care:   Authorization / Certification Start Date: 07/09/19  Authorization / Certification End Date: 10/07/19  Communication with: Referral Source  Patient Related Instruction: Nature of Condition;Treatment plan and rationale;Self Care instruction;Basis of treatment;Body mechanics;Posture;Next steps;Expected outcome;Precautions  Times per Week: 1-2  Number of Weeks: 4-6  Number of Visits:  6-12  Precautions / Restrictions : DM2, osteopenia, PVD with stent right femoral artery  Therapeutic Exercise: ROM;Stretching;Strengthening  Neuromuscular Reeducation: kinesio tape;posture;core  Manual Therapy: soft tissue mobilization  Modalities: electrical stimulation;cold pack      Plan for next visit: review exercises, Continue with MT to left lower back and left buttock region, add clamshell, standing hip extension, abd, and flexion of left LE     Subjective:       From MD Visit 7/1/19:  Abril Mario is a 85 y.o. female presenting to the clinic today to follow up on diabetes and with complaints of leg cramping.      Leg cramping: She has been experiencing a numbness and occasional cramping of her left lower leg. When the leg cramps, it seems to flex inward. She notices it the most when she is in bed and will have to get up and walk around until it goes away. She occasionally gets cramps in her right leg as well but not as severe as in the left. She has some pain in her legs with walking, and she thinks some of it is related to her spine. She was seen at the spine center once last fall, and they were going to send her to physical therapy, but she never heard from them. She feels the numbness in her leg all the time and would be interested in making appointments with therapy now.     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 female who presents to therapy today with complaints of returning left leg pain and left leg numbness. Date of onset/duration of symptoms is one month ago. Onset was gradual and a return of the symptoms she had in the fall of 2018. Symptoms are getting worse. She reports  a constant  history of similar symptoms. She describes their  previous level of function as limited with carrying groceries up/down stair and on level surfaces.  Prolonged standing.  Did not have any treatment at spine clinic in , but pain in back and left leg and foot/toes eventually resolved on it's own until recently returned. Had surgery for spinal stenosis several years ago, Dr. Leong, and had good relief of back pain.    Pain Ratin  Pain rating at best: 3  Pain rating at worst: 9  Pain description: pain and in the lower back and left leg feels more numb and heavy    Functional limitations are described as occurring with:   ascending, descending and worse when carrying something up stairs or curbs  lifting  performing routine daily activities  sitting x 30 minutes  standing x 15 minutes  transitional movements getting out of  bed, chair, car and stands in shower, sit to stand and sit to supine    Patient reports benefit from:  anti-inflammatory, massage chair was painful; uses Alleve prn         Objective:      Note: Items left blank indicates the item was not performed or not indicated at the time of the evaluation.    Patient Outcome Measures :    Modified Oswestry Low Back Pain Disablity Questionnaire  in %: 36     Scores range from 0-100%, where a score of 0% represents minimal pain and maximal function. The minimal clinically important difference is a score reduction of 12%.    Examination  1. Chronic midline low back pain with left-sided sciatica     2. Left leg weakness     3. Decreased ROM of lumbar spine     4. Decreased range of motion of left lower extremity     5. Numbness of left lower extremity       Involved side: Left and occasional right low back pain but primarily left low back and only left leg weakness/numbness  Posture Observation:      General sitting posture is  normal.    Lumbar ROM:    Date: 19     *Indicate scale AROM AROM AROM   Lumbar Flexion Mod loss fingers to knees-tight hamstrings     Lumbar Extension Major loss       Right Left Right Left Right Left   Lumbar Sidebending Major loss Major loss       Lumbar Rotation         Thoracic Flexion      Thoracic Extension      Thoracic Sidebending         Thoracic Rotation           Lower Extremity Strength:     Date: 7/9/19     LE strength/5 Right Left Right Left Right Left   Hip Flexion (L1-3) 3 -3       Hip Extension (L5-S1)         Hip Abduction (L4-5)         Hip Adduction (L2-3)         Hip External Rotation 3 -3       Hip Internal Rotation         Knee Extension (L3-4) 4 4       Knee Flexion         Ankle Dorsiflexion (L4-5)         Great Toe Extension (L5)         Ankle Plantar flexion (S1)         Abdominals        Sensation           Reflex Testing  Lumbar Dermatomes Right Left UE Reflexes Right Left   Iliac Crest and Groin (L1)   Biceps (C5-6)     Anterior Medial Thigh (L2)   Brachioradialis (C5-6)     Anterior Thigh, Medial Epicondyle Femur (L3)   Triceps (C7-8)     Lateral Thigh, Anterior Knee, Medial Leg/Malleolus (L4)   Devang s test     Lateral Leg, Dorsal Foot (L5)   LE Reflexes     Lateral Foot (S1)   Patellar (L3-4)     Posterior Leg (S2)   Achilles (S1-2)     Other:   Babinski Response         Bilateral hamstrings very tight.  Straight leg at 45 degrees mary.  Left hip abduction at 35 degrees.  Hip flexion with knees flexed WNL.     Palpation: tender to palpation of left lumbar paraspinals at L4-5 and tender at left piriformis muscle region    Lumbar Special Tests:     Lumbar Special Tests Right Left SI Tests Right  Left   Quadrant test   SI Compression  +   Straight leg raise  + SI Distraction     Crossover response   POSH Test     Slump   Sacral Thrust     Sit-up test  FADIR     Trunk extensor endurance test  Resisted Abduction     Prone instability test  Other:     Pubic shotgun  Other:         Sit to stand 30 seconds 8x    o2 sats 93-94%  83 bpm    Treatment Today     TREATMENT MINUTES COMMENTS   Evaluation 30 Lumbar evaluation   Self-care/ Home management 8  Discussed low o2 sats; activity level for stretching; home environment and to avoid prolonged standing;educated in 90/90 position for pain relief   Manual therapy     Neuromuscular Re-education 6 Left lumbar paraspinals and left gluteal muscle STM in right sidelying x 6' with moderate pressure   Therapeutic Activity     Therapeutic Exercises 15 See flow sheet   Gait training     Modality__________________                Total 59    Blank areas are intentional and mean the treatment did not include these items.       PT Evaluation Code: (Please list factors)  Patient History/Comorbidities: DM2, osteopenia, PVD with stent in right femoral artery, previous lumbar surgery for foraminal stenosis  Examination: decreased lumbar AROM, decreased L>R hip AROM, weakness left LE  Clinical Presentation: stable  Clinical Decision Making: low    Patient History/  Comorbidities Examination  (body structures and functions, activity limitations, and/or participation restrictions) Clinical Presentation Clinical Decision Making (Complexity)   No documented Comorbidities or personal factors 1-2 Elements Stable and/or uncomplicated Low   1-2 documented comorbidities or personal factor 3 Elements Evolving clinical presentation with changing characteristics Moderate   3-4 documented comorbidities or personal factors 4 or more Unstable and unpredictable High              Tahira Escobar, PT  7/9/2019  10:33 AM

## 2021-05-30 NOTE — PROGRESS NOTES
[facility] Daily Progress     Patient Name: Abril Pinto  Date: 2019  Visit #: 3/12     auth to 10/7/19  PTA visit #:  2  Referral Diagnosis: Chronic midline low back pain with left-sided sciatica  Referring provider: Beltran Walker MD  Visit Diagnosis:     ICD-10-CM    1. Chronic midline low back pain with left-sided sciatica M54.42     G89.29    2. Left leg weakness R29.898    3. Decreased ROM of lumbar spine M53.86    4. Decreased range of motion of left lower extremity M25.662    5. Numbness of left lower extremity R20.0          Assessment:     Reports some decreased pain and feeling better  Walking is better as long as she goes slowly  Complaint with HEP  Pt would benefit with continuing skilled physical therapy treatments    Goal Status:  Ongoing  Pt. will demonstrate/verbalize independence in self-management of condition in : 12 weeks  Pt. will be independent with home exercise program in : 12 weeks  Pt. will report decreased intensity, frequency of : in 12 weeks  Pt. will be able to walk : 30 minutes;with less pain;with less difficulty;for community mobility;in 12 weeks  Patient will transfer: sit/stand;for in/out of chair;with less pain;in 12 weeks    Pt will: be able to carry groceries into house with less pain and difficulty      Plan / Patient Education:     Continue POC  Plan for next visit: review exercises, Continue with MT to left lower back and left buttock region,  standing hip extension, abd, and flexion of left LE  Subjective:     Pain Ratin/10 right sided back feeling better overall  alieve this morning  Sleeping is good  Walking is difffculty with tightness in calves slowly I can go 15-20 mins now  Ex 2x/day      Objective:     Reviewed HEP/posture cues needed  Reviewed  clams 10x mary  Added heel/toe raises, calf stretch mary, sciatic nerve glide supine  Continued MT left SL per flow sheet  Tolerated treatment well    Treatment Today  2019    TREATMENT MINUTES  COMMENTS   Evaluation     Self-care/ Home management     Manual therapy 10 Left lumbar paraspinals and left gluteal muscle STM in right sidelying x10' with moderate pressure   Neuromuscular Re-education     Therapeutic Activity     Therapeutic Exercises 13 Ex flow sheet cues needed/sciatic nerve glide KTC/hip ER/SLR supine   Gait training     Modality__________________                Total 23    Blank areas are intentional and mean the treatment did not include these items.       Pillo Power  7/23/2019

## 2021-05-31 ENCOUNTER — RECORDS - HEALTHEAST (OUTPATIENT)
Dept: ADMINISTRATIVE | Facility: CLINIC | Age: 86
End: 2021-05-31

## 2021-05-31 VITALS — BODY MASS INDEX: 31.19 KG/M2 | WEIGHT: 169.5 LBS | HEIGHT: 62 IN

## 2021-05-31 VITALS — BODY MASS INDEX: 31.24 KG/M2 | WEIGHT: 170.8 LBS

## 2021-05-31 VITALS — BODY MASS INDEX: 30.55 KG/M2 | HEIGHT: 62 IN | WEIGHT: 166 LBS

## 2021-05-31 VITALS — BODY MASS INDEX: 31.54 KG/M2 | WEIGHT: 171.4 LBS | HEIGHT: 62 IN

## 2021-05-31 NOTE — PROGRESS NOTES
[facility] Discharge Summary  Patient Name: Abril Pinto  Date: 8/7/2019  Referral Diagnosis: Chronic midline low back pain with left-sided sciatica  Referring provider: Beltran Walker MD  Visit Diagnosis:   1. Chronic midline low back pain with left-sided sciatica     2. Left leg weakness     3. Decreased ROM of lumbar spine     4. Decreased range of motion of left lower extremity     5. Numbness of left lower extremity         Goals:  Pt. will demonstrate/verbalize independence in self-management of condition in : Met  Pt. will be independent with home exercise program in : Met  Pt. will report decreased intensity, frequency of : Pain;Met  Pt. will be able to walk : 30 minutes;with less pain;with less difficulty;for community mobility;in 12 weeks;Met  Patient will transfer: sit/stand;for in/out of chair;with less pain;in 12 weeks;Met    Pt will: be able to carry groceries into house with less pain and difficulty-MET    Improved ITA score for lumbar from 38 to 12 points-significant change.    Patient was seen for 6 visits from 7/9/19 to 8/7/19 with 0 missed appointments.  The patient met goals and has demonstrated understanding of and independence in the home program for self-care, and progression to next steps.  She will initiate contact if questions or concerns arise.  The patient reports feeling better and did not wish to schedule further therapy at this time.  Patient received a home program and demonstrates independence with it.  No further therapy is required at this time.    Therapy will be discontinued at this time and will keep chart open for 30 days. Will DC patient at that time and patient will need a new referral to resume.    Thank you for your referral.  Tahira Escobar, PT  8/7/2019  11:08 AM  [facility] Daily Progress     Patient Name: Abril Pinto  Date: 8/7/2019  Visit #: 6/12     auth to 10/7/19  PTA visit #:  2  Referral Diagnosis: Chronic midline low back pain  with left-sided sciatica  Referring provider: Beltran Walker MD  Visit Diagnosis:     ICD-10-CM    1. Chronic midline low back pain with left-sided sciatica M54.42     G89.29    2. Left leg weakness R29.898    3. Decreased ROM of lumbar spine M53.86    4. Decreased range of motion of left lower extremity M25.662    5. Numbness of left lower extremity R20.0          Assessment:     Reports decreased pain and feeling better overall  Walking is tolerated x 15 minutes as long as she goes slowly  Complaint with HEP  Slowly improving ROM bilateral hips and lumbar spine and strength of trunk and lower extremities  Improved sit to stand score from 8x to 10x in 30 seconds    Goal Status:  Ongoing  Pt. will demonstrate/verbalize independence in self-management of condition in : Met  Pt. will be independent with home exercise program in : Met  Pt. will report decreased intensity, frequency of : Pain;Met  Pt. will be able to walk : 30 minutes;with less pain;with less difficulty;for community mobility;in 12 weeks;Met  Patient will transfer: sit/stand;for in/out of chair;with less pain;in 12 weeks;Met    Pt will: be able to carry groceries into house with less pain and difficulty-MET      Plan / Patient Education:     Plan is for patient to try to self manage symptoms going forward. She is independent with home exercise program and understands importance of activity level and rest positions as well as use of ice.  Will DC from PT    Subjective:     Pain Ratin/10 at rest; 5/10 more stiffness than soreness now  [facility] a lot of standing 3 days ago  No pain meds or ice today    Right low back pain with movement/standing, worse when first get out of bed  Right low back soreness  Left low back/hip tiredness with exercises  No leg pain or parasthesias    Overall improving-less tenderness noted in back today    Ex 2x/day      Objective:     Gait speed moderate (improved from slow)  Hip abduction improved to 3/5 MMT  mary    30 seconds sit to stand (no hands) 10x from last visit  TUG 9 seconds    Lumbar Flexion: finger tips to shin-stiffness-improved  Extension: major loss only stiffness noted -improved  Right side bend mod loss with minimal right side pain  Left side bend with mod loss without pain - improved    Treatment Today  8/7/2019    TREATMENT MINUTES COMMENTS   Evaluation     Self-care/ Home management 3 Discussed supine 90/90 when in pain, discussed continued need for cold pack to right low back   Manual therapy 12 Bilateral lumbar paraspinals STM prone over 2 pillows x10' with moderate pressure   Neuromuscular Re-education     Therapeutic Activity     Therapeutic Exercises 10 Ex per flow sheet-added prone hip IR/ER and knee flexion   Gait training     Modality__________________                Total 25    Blank areas are intentional and mean the treatment did not include these items.       Tahira Escobar, PT  8/7/2019

## 2021-05-31 NOTE — TELEPHONE ENCOUNTER
RN cannot approve Refill Request    RN can NOT refill this medication historical medication requested.         Edwina Chin, [facility] Triage/Med Refill 8/26/2019    Requested Prescriptions   Pending Prescriptions Disp Refills     amLODIPine (NORVASC) 10 MG tablet [Pharmacy Med Name: AMLODIPINE 10MG TAB] 90 tablet 3     Sig: TAKE 1 TABLET BY MOUTH ONCE DAILY       Calcium-Channel Blockers Protocol Passed - 8/26/2019  9:29 AM        Passed - PCP or prescribing provider visit in past 12 months or next 3 months     Last office visit with prescriber/PCP: 7/1/2019 Beltran Walker MD OR same dept: 7/1/2019 Beltran Walker MD OR same specialty: 7/1/2019 Beltran Walker MD  Last physical: 11/7/2017 Last MTM visit: Visit date not found   Next visit within 3 mo: Visit date not found  Next physical within 3 mo: Visit date not found  Prescriber OR PCP: Beltran Walker MD  Last diagnosis associated with med order: There are no diagnoses linked to this encounter.  If protocol passes may refill for 12 months if within 3 months of last provider visit (or a total of 15 months).             Passed - Blood pressure filed in past 12 months     BP Readings from Last 1 Encounters:   07/01/19 130/50

## 2021-05-31 NOTE — PROGRESS NOTES
[facility] Daily Progress     Patient Name: Abril Pinto  Date: 2019  Visit #:      auth to 10/7/19  PTA visit #:  2  Referral Diagnosis: Chronic midline low back pain with left-sided sciatica  Referring provider: Beltran Walker MD  Visit Diagnosis:     ICD-10-CM    1. Chronic midline low back pain with left-sided sciatica M54.42     G89.29    2. Left leg weakness R29.898    3. Decreased ROM of lumbar spine M53.86    4. Decreased range of motion of left lower extremity M25.662    5. Numbness of left lower extremity R20.0          Assessment:     Reports decreased pain and feeling better overall  Walking is tolerated x 15 minutes as long as she goes slowly  Complaint with HEP  Pt would benefit with continuing skilled physical therapy treatments  Decreased ROM bilateral hips and lumbar spine and generalized weakness of trunk and lower extremities ongoing  Improved sit to stand score from 8x to 10x in 30 seconds    Goal Status:  Ongoing  Pt. will demonstrate/verbalize independence in self-management of condition in : Progressing toward  Pt. will be independent with home exercise program in : Progressing toward  Pt. will report decreased intensity, frequency of : Progressing toward;Pain  Pt. will be able to walk : 30 minutes;with less pain;with less difficulty;for community mobility;in 12 weeks;Progressing toward  Patient will transfer: sit/stand;for in/out of chair;with less pain;in 12 weeks;Met    Pt will: be able to carry groceries into house with less pain and difficulty      Plan / Patient Education:     Plan for one more visit for MT/exercise progression/home management training  Plan for next visit: review new exercises, continue with MT to paraspinals of lower back, add abdominal partial curl up    Do ITA, and lumbar AROM assessment. Possible dc after next visit  Subjective:     Pain Ratin/10 at rest; 7/10  Right low back pain with movement/standing, worse when first get out of  bed  Right low back soreness  Left low back/hip tiredness with exercises  No leg pain or parasthesias    Overall improving    Rode to [place] this past weekend, had stiffness when got out of the car  Only one fredy horse in past week  No aleve this morning  Walking not daily yet: is difffculty with tightness in calves slowly I can go 15-20 mins now  Ex 2x/day      Objective:     Gait speed moderate (improved from slow)  Hip abduction improved to 3/5 MMT mary  Hamstring tightness bilateral    30 seconds sit to stand (no hands) 10x  TUG 9 seconds    From last visit:   Lumbar Flexion: finger tips to shin  Extension: major loss   Right side bend mod loss with right  Left side bend with mod loss with less pain in midline    Treatment Today  8/1/2019    TREATMENT MINUTES COMMENTS   Evaluation     Self-care/ Home management 3 Discussed foot stool for ironing-no significant difference, discussed supine 90/90 when in pain, discussed continued need for cold pack to right low back   Manual therapy 15 Bilateral lumbar paraspinals STM prone over 2 pillows x10' with moderate pressure   Neuromuscular Re-education     Therapeutic Activity     Therapeutic Exercises 10 Ex per flow sheet   Gait training     Modality__________________                Total 28    Blank areas are intentional and mean the treatment did not include these items.       Tahira Escobar, PT  8/1/2019

## 2021-06-01 VITALS — HEIGHT: 62 IN | BODY MASS INDEX: 31.68 KG/M2 | WEIGHT: 172.19 LBS

## 2021-06-01 VITALS — WEIGHT: 168.6 LBS | HEIGHT: 62 IN | BODY MASS INDEX: 31.03 KG/M2

## 2021-06-01 VITALS — BODY MASS INDEX: 31.16 KG/M2 | HEIGHT: 62 IN | WEIGHT: 169.31 LBS

## 2021-06-01 NOTE — TELEPHONE ENCOUNTER
RN cannot approve Refill Request    RN can NOT refill this medication historical medication requested.         Edwina Chin, [facility] Triage/Med Refill 9/24/2019    Requested Prescriptions   Pending Prescriptions Disp Refills     pravastatin (PRAVACHOL) 40 MG tablet [Pharmacy Med Name: PRAVASTATIN 40MG    TAB] 90 tablet 3     Sig: TAKE 1 TABLET BY MOUTH ONCE DAILY       Statins Refill Protocol (Hmg CoA Reductase Inhibitors) Passed - 9/23/2019  6:00 PM        Passed - PCP or prescribing provider visit in past 12 months      Last office visit with prescriber/PCP: 7/1/2019 Beltran Walker MD OR same dept: 7/1/2019 Beltran Walker MD OR same specialty: 7/1/2019 Beltran Walker MD  Last physical: 11/7/2017 Last MTM visit: Visit date not found   Next visit within 3 mo: Visit date not found  Next physical within 3 mo: Visit date not found  Prescriber OR PCP: Beltran Walker MD  Last diagnosis associated with med order: 1. Hyperlipidemia  - pravastatin (PRAVACHOL) 40 MG tablet [Pharmacy Med Name: PRAVASTATIN 40MG    TAB]; TAKE 1 TABLET BY MOUTH ONCE DAILY  Dispense: 90 tablet; Refill: 3    If protocol passes may refill for 12 months if within 3 months of last provider visit (or a total of 15 months).

## 2021-06-01 NOTE — PROGRESS NOTES
Assessment and Plan:     1. Medicare annual wellness visit, subsequent  Abril is retired and lives with her .  He is developing some issues with forgetfulness.  Was scores 5 on mini cog and has no evidence for cognitive decline.  Her health risk assessment was reviewed.  Health maintenance habits seem good.  She is independent in activities of daily living.  She does have a healthcare directive.    2. Controlled type 2 diabetes mellitus with complication, without long-term current use of insulin (H)  She is treated with metformin without adverse effects.  Hemoglobin A1c remains good at 6.5.  - Comprehensive Metabolic Panel  - Lipid Cascade  - Glycosylated Hemoglobin A1c  - Microalbumin, Random Urine  Metformin 500 mg twice daily with food  3. Malignant melanoma of skin of upper limb, including shoulder, right (H)  No skin lesions suspicious for malignancy are noted today.  She saw Dr. Nichole Hoffman last in June.  3-month follow-up visit was advised    4. Atherosclerosis of native artery of both lower extremities with intermittent claudication (H)  Arterial ultrasounds from July were reviewed.  Findings were suggestive of mild claudication on the right and mild to moderate claudication on the left.  Currently she is relatively asymptomatic.  A regular walking program was encouraged    5. Osteopenia  Bone density was in the normal range when last checked in 2016.  A recheck in several years is advised    6. Essential hypertension  Treated with lisinopril-HCTZ 20-25 daily, metoprolol XL 25 mg daily, and amlodipine 10 mg daily.  Blood pressure is acceptable on this regimen  - Comprehensive Metabolic Panel  - Urinalysis-UC if Indicated  - lisinopril-hydrochlorothiazide (PRINZIDE,ZESTORETIC) 20-25 mg per tablet; Take 1 tablet by mouth daily.  Dispense: 90 tablet; Refill: 3  - Culture, Urine    7. Benign neoplasm of colon, unspecified part of colon  Hemogram will be checked.  Given her age, I would not further  screening colonoscopies  - 2(CBC w/o Differential)    8. Mixed hyperlipidemia  She is on Pravachol with a history of poor tolerance to simvastatin.  Fasting lipid cascade will be checked  - Lipid Cascade    9. Spinal stenosis of lumbar region without neurogenic claudication  She still notes low back discomfort but finds it manageable    10. Hypothyroidism  She appears clinically euthyroid.  TSH will be checked  - levothyroxine (SYNTHROID, LEVOTHROID) 150 MCG tablet; Take 1 tablet (150 mcg total) by mouth daily.  Dispense: 90 tablet; Refill: 3  - Thyroid Stimulating Hormone (TSH)      11 Gastroesophageal reflux disease without esophagitis  Is under good control with omeprazole.  She will note rapid recurrence off of it  - omeprazole (PRILOSEC) 20 MG capsule; Take 1 capsule (20 mg total) by mouth daily before breakfast.  Dispense: 90 capsule; Refill: 3      plan  1.  The patient's current medical problems were reviewed.  2.  Mammogram is due in December  3.  Labs as outlined.  She will be notified of test results  4.  Continue current medications  5.  See in 6 months for diabetic recheck  The following health maintenance schedule was reviewed with the patient and provided in printed form in the after visit summary:   Health Maintenance   Topic Date Due     DIABETES OPHTHALMOLOGY EXAM  10/17/1943     PNEUMOCOCCAL IMMUNIZATION 65+ LOW/MEDIUM RISK (2 of 2 - PPSV23) 11/14/2015     DIABETES FOLLOW-UP  05/07/2018     ZOSTER VACCINES (3 of 3) 07/20/2018     DXA SCAN  08/09/2018     MEDICARE ANNUAL WELLNESS VISIT  11/07/2018     DIABETES FOOT EXAM  11/07/2018     FALL RISK ASSESSMENT  11/23/2019     DIABETES HEMOGLOBIN A1C  01/01/2020     DIABETES URINE MICROALBUMIN  07/01/2020     ADVANCE CARE PLANNING  11/07/2022     TD 18+ HE  08/17/2026     INFLUENZA VACCINE RULE BASED  Completed        Subjective:   Chief Complaint: Abril Pinto is an 85 y.o. female here for an Annual Wellness visit.     HPI:   Abril states that  overall she is doing well. Sometimes she is short of breath but it is dependent on what she is doing. Her bladder control has good days and bad days. Today particularly is a bad day. She has been doing physical therapy and does her exercises to relieve her chronic pain. Her leg pain has subsided with that but she is not sure her lower back pain is being affected by it. She still gets the sharp pain above her bottom. She had a small surface melanoma removed by her dermatologist snf now has to go in routinely for check, her neck appointment is in a couple of weeks. She does not have any health concerns at this time.     Health Maintenance: Shingrix discussed.     Review of Systems:   Denies heart burn   Denies leg pain  The rest of the review of systems are negative for all systems.    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     Osteopenia     Chronic Reflux Esophagitis     Tachycardia     Urethral stenosis     Peripheral vascular disease (H)     Colon polyps     Mixed hyperlipidemia     Spinal stenosis of lumbar region without neurogenic claudication     Controlled type 2 diabetes mellitus with complication, without long-term current use of insulin (H)     Malignant melanoma of skin of upper limb, including shoulder, right (H)     Past Medical History:   Diagnosis Date     Carotid artery stenosis      Chronic reflux esophagitis      Colon polyps      Diabetes type 2, controlled (H)       Esophageal reflux      Hyperlipidemia      Hypertension      Hypothyroidism      Lichen planus      Osteopenia      Ovarian cyst     benign mucinous epithelial     Peripheral vascular disease (H)      Spinal stenosis of lumbar region without neurogenic claudication 3/20/2019     Tachycardia      Thrombocytopenia (H)      Urethral stenosis 5/2/2018      Past Surgical History:   Procedure Laterality Date     COLONOSCOPY N/A 6/6/2018    Procedure: COLONOSCOPY WITH POLYPECTOMY;  Surgeon: Zoran Leiva MD;  Location: [facility] OR;  Service:      FEMORAL ARTERY STENT      PTA femoral-popliteal initial with stent     HERNIA REPAIR       LUMBAR LAMINECTOMY       SALPINGOOPHORECTOMY Bilateral 2008     SPINE SURGERY      laminectomy decompressive 2 lumbar segments     TOTAL ABDOMINAL HYSTERECTOMY  1988      Family History   Problem Relation Age of Onset     Cancer Other         adenocarcinoma of the pancreas     Leukemia Other      Hypothyroidism Other      Breast cancer Sister       Social History     Socioeconomic History     Marital status:      Spouse name: Not on file     Number of children: Not on file     Years of education: Not on file     Highest education level: Not on file   Occupational History     Not on file   Social Needs     Financial resource strain: Not on file     Food insecurity:     Worry: Not on file     Inability: Not on file     Transportation needs:     Medical: Not on file     Non-medical: Not on file   Tobacco Use     Smoking status: Former Smoker     Years: 10.00     Types: Cigarettes     Smokeless tobacco: Never Used   Substance and Sexual Activity     Alcohol use: Yes     Comment: occasion     Drug use: No     Sexual activity: Not on file   Lifestyle     Physical activity:     Days per week: Not on file     Minutes per session: Not on file     Stress: Not on file   Relationships     Social connections:     Talks on phone: Not on file     Gets together: Not on file     Attends  [employer] service: Not on file     Active member of club or organization: Not on file     Attends meetings of clubs or organizations: Not on file     Relationship status: Not on file     Intimate partner violence:     Fear of current or ex partner: Not on file     Emotionally abused: Not on file     Physically abused: Not on file     Forced sexual activity: Not on file   Other Topics Concern     Not on file   Social History Narrative     Not on file      Current Outpatient Medications   Medication Sig Dispense Refill     amLODIPine (NORVASC) 10 MG tablet Take 10 mg by mouth daily.       amLODIPine (NORVASC) 10 MG tablet TAKE 1 TABLET BY MOUTH ONCE DAILY 90 tablet 3     calcium, as carbonate, (OS-ROLA) 500 mg calcium (1,250 mg) tablet Take 1 tablet by mouth daily.       cholecalciferol, vitamin D3, 1,000 unit tablet Take 1,000 Units by mouth daily.       CONTOUR NEXT STRIPS strips Use 1 each As Directed daily. 50 strip 11     ezetimibe (ZETIA) 10 mg tablet Take 10 mg by mouth daily.       generic lancets (MICROLET LANCET) Use 1 each As Directed daily. 100 each 3     levothyroxine (SYNTHROID, LEVOTHROID) 150 MCG tablet Take 1 tablet (150 mcg total) by mouth daily. 90 tablet 3     lisinopril-hydrochlorothiazide (PRINZIDE,ZESTORETIC) 20-25 mg per tablet Take 1 tablet by mouth daily. 90 tablet 3     metFORMIN (GLUCOPHAGE) 500 MG tablet Take 1 tablet (500 mg total) by mouth 2 (two) times a day with meals. 180 tablet 3     metoprolol succinate (TOPROL-XL) 25 MG Take 1 tablet (25 mg total) by mouth daily. 90 tablet 3     omeprazole (PRILOSEC) 20 MG capsule Take 1 capsule (20 mg total) by mouth daily before breakfast. 90 capsule 3     potassium gluconate 550 mg (90 mg) tablet Take 90 mg by mouth daily.       pravastatin (PRAVACHOL) 40 MG tablet Take 1 tablet (40 mg total) by mouth daily. 90 tablet 3     pravastatin (PRAVACHOL) 40 MG tablet Take 40 mg by mouth daily.       No current facility-administered medications for this  "visit.       Objective:   Vital Signs:   Visit Vitals  /62 (Patient Site: Left Arm, Patient Position: Sitting, Cuff Size: Adult Large)   Pulse 70   Ht 5' 2\" (1.575 m)   Wt 162 lb (73.5 kg)   LMP 12/03/1988   SpO2 97%   BMI 29.63 kg/m         VisionScreening:  No exam data present     PHYSICAL EXAM  Constitutional:   Reveals alert, talkative moderately obese woman. Affect appropriate. Does not appear acutely ill.  Vitals: per nursing notes.  HEENT:  Ears:  External canals, TMs clear.    Eyes:  EOMs full, PERRL.  Oropharynx:   Mouth and throat clear, no thrush or exudate.  Neck:  Supple, no carotid bruits or adenopathy.  Back:  No spine or CVA pain.  Thorax:  No bony deformities.  Lungs: Clear to A&P without rales or wheezes.  Respiratory effort normal.  Cardiac:   Regular rate and rhythm, normal S1, S2, no murmur or gallop.  Abdomen:  Soft,moderately obese  active bowel sounds without bruits, mass, or tenderness. No inguinal hernias.  Extremities: Mod stasis changes at ankle. Pulse palpable lower left leg. Pulse non palpable lower right. Capillary filling normal bilaterally   Skin: Clear. No lesions to suggest malignancy.  Neuro:  Alert and oriented. Cranial nerves, motor, sensory exams are intact.  No gross focal deficits.  Psychiatric:  Memory intact, mood appropriate.      Assessment Results 10/1/2019   Activities of Daily Living No help needed   Instrumental Activities of Daily Living No help needed   Get Up and Go Score Less than 12 seconds   Mini Cog Total Score 5   Some recent data might be hidden     A Mini-Cog score of 0-2 suggests the possibility of dementia, score of 3-5 suggests no dementia    Identified Health Risks:     The patient was counseled and encouraged to consider modifying their diet and eating habits. She was provided with information on recommended healthy diet options.  Information on urinary incontinence and treatment options given to patient.  Patient's advanced directive was " discussed and I am comfortable with the patient's wishes.    ADDITIONAL HISTORY SUMMARIZED (2): None.  DECISION TO OBTAIN EXTRA INFORMATION (1): None.   RADIOLOGY TESTS (1): Reviewed DXA Bone Density 8/9/2016 .  LABS (1): Labs reviewed and ordered.  MEDICINE TESTS (1): None.  INDEPENDENT REVIEW (2 each): None.       The visit lasted a total of 15 minutes face to face with the patient. Over 50% of the time was spent counseling and educating the patient about annual wellness.    I, Vianney Redman, am scribing for and in the presence of, Dr. Walker.    I, Dr. Beltran Walker, personally performed the services described in this documentation, as scribed by Vianney Redman in my presence, and it is both accurate and complete.    Total data points: 2

## 2021-06-02 VITALS — BODY MASS INDEX: 30.54 KG/M2 | WEIGHT: 167 LBS

## 2021-06-02 VITALS — HEIGHT: 62 IN | BODY MASS INDEX: 31.11 KG/M2 | WEIGHT: 169.06 LBS

## 2021-06-02 VITALS — WEIGHT: 164 LBS | BODY MASS INDEX: 30.18 KG/M2 | HEIGHT: 62 IN

## 2021-06-03 VITALS
WEIGHT: 162 LBS | SYSTOLIC BLOOD PRESSURE: 132 MMHG | BODY MASS INDEX: 29.81 KG/M2 | HEART RATE: 70 BPM | OXYGEN SATURATION: 97 % | DIASTOLIC BLOOD PRESSURE: 62 MMHG | HEIGHT: 62 IN

## 2021-06-03 VITALS — HEIGHT: 62 IN | WEIGHT: 166 LBS | BODY MASS INDEX: 30.55 KG/M2

## 2021-06-03 NOTE — TELEPHONE ENCOUNTER
Pt states she has history of sinus.  Pt states the symptom started a week ago  Has bad cough.  Pt states both her and her  her congestion.  No sinus pain.  Pt has thick mucus coming out from her and her  nose.  No fever.  No sore throat,   No earache.  Has mild difficulty breathing because of the congestion.  Pt states they drink and eat okay.  The wife states both her and her  has the same symptom.  Pt states the PCP will give her medication when she has this kind of symptom.  Pt ask the PCP to send medication for her and her  to the pharmacy.  Has  name is Armani Pinto.  Care advice given per protocol.  Patient agrees with care advice given.    Agreed to call back if he has additional symptoms or questions.       Hector Lange RN, [facility] Triage/Med Refill 11/25/2019 2:42 PM        Reason for Disposition    [1] Sinus congestion as part of a cold AND [2] present < 10 days    Protocols used: SINUS PAIN OR CONGESTION-A-

## 2021-06-03 NOTE — TELEPHONE ENCOUNTER
OK for Zithromax Z-DAO for patient and her  Armani.  They should be seen if not improving in 1 week.

## 2021-06-04 VITALS
HEART RATE: 84 BPM | OXYGEN SATURATION: 96 % | DIASTOLIC BLOOD PRESSURE: 70 MMHG | WEIGHT: 165 LBS | BODY MASS INDEX: 30.18 KG/M2 | SYSTOLIC BLOOD PRESSURE: 132 MMHG

## 2021-06-04 NOTE — TELEPHONE ENCOUNTER
RN cannot approve Refill Request    RN can NOT refill this medication historical medication requested.       Edwnia Chin, [facility] Triage/Med Refill 12/5/2019    Requested Prescriptions   Pending Prescriptions Disp Refills     ezetimibe (ZETIA) 10 mg tablet [Pharmacy Med Name: EZETIMIBE 10MG TAB] 90 tablet 3     Sig: TAKE 1 TABLET BY MOUTH ONCE DAILY       Lovaza/Ezetimibe-Combination Refill Protocol Passed - 12/3/2019  8:26 PM        Passed - Fasting lipid cascade in last 12 months     Cholesterol   Date Value Ref Range Status   10/01/2019 131 <=199 mg/dL Final     Triglycerides   Date Value Ref Range Status   10/01/2019 125 <=149 mg/dL Final     HDL Cholesterol   Date Value Ref Range Status   10/01/2019 46 (L) >=50 mg/dL Final     LDL Calculated   Date Value Ref Range Status   10/01/2019 60 <=129 mg/dL Final     Patient Fasting > 8hrs?   Date Value Ref Range Status   10/01/2019 Yes  Final             Passed - PCP or prescribing provider visit in past 12 months       Last office visit with prescriber/PCP: 7/1/2019 Beltran Walker MD OR same dept: 7/1/2019 Beltran Walker MD OR same specialty: 7/1/2019 Beltran Walker MD  Last physical: 10/1/2019 Last MTM visit: Visit date not found   Next visit within 3 mo: Visit date not found  Next physical within 3 mo: Visit date not found  Prescriber OR PCP: Beltran Walker MD  Last diagnosis associated with med order: 1. Hyperlipidemia  - ezetimibe (ZETIA) 10 mg tablet [Pharmacy Med Name: EZETIMIBE 10MG TAB]; TAKE 1 TABLET BY MOUTH ONCE DAILY  Dispense: 90 tablet; Refill: 3    If protocol passes may refill for 12 months if within 3 months of last provider visit (or a total of 15 months).

## 2021-06-08 NOTE — PROGRESS NOTES
ASSESSMENT:  1.  Right lumbar radiculopathy:  Jocelin had a previous two-level decompressive laminectomy for spinal stenosis.  She also has a history of peripheral vascular disease with stent for claudication.  Current pain seems more radicular, and not vascular.  She does not have hard neurologic findings  2.  Type 2 diabetes mellitus managed with diet:  Hemoglobin A1c was 6.2 in August.  This will be rechecked  3.  Hypertension:  Good control on current regimen  4.  Hyperlipidemia:  Last lipids were good on Pravachol 40 mg daily    PLAN:  1.  Medrol Dosepak.  Reported effect than radicular pain in right leg  2.  Schedule MRI of the LS spine if discomfort does not improve with Medrol  3.  Check hemoglobin A1c and basic metabolic panel for follow-up of type 2 diabetes  4.  Clinic follow-up in 6 months or as needed  No orders of the defined types were placed in this encounter.    There are no discontinued medications.        ASSESSED PROBLEMS:  No diagnosis found.    CHIEF COMPLAINT:  Chief Complaint   Patient presents with     Leg Pain     started last week, pain starts in the lower back and goes down the Right leg < stent  3 years ago       HISTORY OF PRESENT ILLNESS:  Abril Mario is a 83 y.o. female presenting to the clinic today for leg pain. She began to notice a sharp pain in her right leg last week and it has continued to worsen. She worked at her [employer] over the weekend and feel that may have exacerbated the pain. She describes the pain as constant and sharp with no change when walking and is relieved with sitting. She denies an overall weakness in the right leg, but recalls her right knee giving out once. She has no history of injury to her leg. She had back surgery for a spinal stenosis in 2008; she did not have pain in her leg for this. She denies pain in her left leg but has noticed coldness and an occasional dull ache.     REVIEW OF SYSTEMS:   Overall, she is feeling well. She has been having a lot of gas;  "she has had no change in her diet. She had a colonoscopy in 2012. All other systems are negative.    PFSH:      TOBACCO USE:  History   Smoking Status     Former Smoker     Years: 10.00     Types: Cigarettes   Smokeless Tobacco     Never Used       VITALS:  Vitals:    01/24/17 0922   BP: 138/70   Patient Site: Left Arm   Patient Position: Sitting   Cuff Size: Adult Regular   Pulse: 72   Resp: 16   Temp: 97.3  F (36.3  C)   TempSrc: Oral   Weight: 176 lb 3.2 oz (79.9 kg)   Height: 5' 2\" (1.575 m)     Wt Readings from Last 3 Encounters:   01/24/17 176 lb 3.2 oz (79.9 kg)   08/04/16 170 lb (77.1 kg)   05/21/15 171 lb 4.8 oz (77.7 kg)     Body mass index is 32.23 kg/(m^2).    PHYSICAL EXAM:  Constitutional:   Reveals an alert, pleasant, talkative female.  Vitals: per nursing notes.  HEENT:  Ears:  External canals, TMs clear.    Eyes:  EOMs full, PERRL.  Oropharynx:   Mouth and throat clear, no thrush or exudate.  Neck:  Supple, no carotid bruits or adenopathy.  Back:  No spine or CVA pain. No abnormal kyphosis. Tenderness to touch in right lower lumbar area. No tenderness over SI joint.   Thorax:  No bony deformities.  Lungs: Clear to A&P without rales or wheezes.  Respiratory effort normal.  Cardiac:   Regular rate and rhythm, normal S1, S2, no murmur or gallop.  Breasts:   No masses, no axillary adenopathy.  Abdomen:  Soft, active bowel sounds without bruits, mass, or tenderness.  Extremities:   No peripheral edema, pulses in the feet intact.  Negative straight leg raise bilaterally. No tenderness over right greater trochanter.   Skin:  No jaundice, peripheral cyanosis or lesions to suggest malignancy.  Neuro:  Alert and oriented. Cranial nerves, motor, sensory exams are intact.  No gross focal deficits.  Psychiatric:  Memory intact, mood appropriate.      ADDITIONAL HISTORY SUMMARIZED (2): Reviewed note from 02/20/12 regarding colonoscopy.   DECISION TO OBTAIN EXTRA INFORMATION (1): None.   RADIOLOGY TESTS (1): " None.  LABS (1): Reviewed and ordered labs  MEDICINE TESTS (1): None.  INDEPENDENT REVIEW (2 each): None.     The visit lasted a total of 18 minutes face to face with the patient. Over 50% of the time was spent counseling and educating the patient about pain management and coordination of care.    I, Meagan Hanks, am scribing for and in the presence of, Dr. Walker.    I, Dr. Walker, personally performed the services described in this documentation, as scribed by Meagan Hanks in my presence, and it is both accurate and complete.    Dragon dictation was used for this note.  Speech recognition errors are a possibility.    MEDICATIONS:  Current Outpatient Prescriptions   Medication Sig Dispense Refill     amLODIPine (NORVASC) 10 MG tablet TAKE ONE TABLET BY MOUTH ONCE DAILY 90 tablet 3     atenolol (TENORMIN) 25 MG tablet TAKE ONE TABLET BY MOUTH ONCE DAILY 90 tablet 3     calcium-vitamin D (CALCIUM-VITAMIN D) 500 mg(1,250mg) -200 unit per tablet Take 1 tablet by mouth 2 (two) times a day with meals.       cholecalciferol, vitamin D3, 5,000 unit Tab Take 5,000 Units by mouth daily.       levothyroxine (SYNTHROID, LEVOTHROID) 125 MCG tablet TAKE ONE TABLET BY MOUTH ONCE DAILY 90 tablet 3     lisinopril-hydrochlorothiazide (PRINZIDE,ZESTORETIC) 20-25 mg per tablet TAKE ONE TABLET BY MOUTH ONCE DAILY 90 tablet 3     omeprazole (PRILOSEC) 20 MG capsule TAKE ONE CAPSULE BY MOUTH ONCE DAILY 90 capsule 3     potassium gluconate 550 mg (90 mg) tablet Take 90 mg by mouth daily.       pravastatin (PRAVACHOL) 40 MG tablet TAKE ONE TABLET BY MOUTH ONCE DAILY 90 tablet 3     ZETIA 10 mg tablet TAKE ONE TABLET BY MOUTH ONCE DAILY 90 tablet 3     No current facility-administered medications for this visit.        Total data points: 3

## 2021-06-10 NOTE — PATIENT INSTRUCTIONS - HE
1.  I will notify you of test results.    2. Flu shot in the fall    4. Annual Wellness  Visit in October

## 2021-06-10 NOTE — PROGRESS NOTES
"ASSESSMENT:  1.  Type 2 diabetes mellitus:  She has not been checking blood sugars lately since they had been looking good in the past.  She tolerates metformin without adverse effects.  Hemoglobin A1c is excellent at 6.3.    2.  Thrombocytopenia:.    This has been mild with no bleeding complications.  She has not noticed petechiae.  Hemogram 2 will be checked    3.  Essential hypertension:  Control has been good on current regimen.  However, she has been developing some problems with peripheral edema left leg greater than right.  I suspect amlodipine may be a complicating factor.  She does not feel this is troublesome enough to change the medication.  She does report a \"dry throat \".  This possibly could be an adverse effect from lisinopril.  After discussion, it does not trouble her enough to want to switch the medication.    4.  Peripheral vascular disease:  She reports some low back pain with walking but no symptoms to suggest claudication.    5.  Peripheral edema:  As noted above, I suspect this is complicated by use of amlodipine.   Labs will be drawn to screen for other secondary causes of peripheral edema.  If edema becomes more troublesome in the future, I would favor lowering the amlodipine dose and replacing it with another agent.    6.  Mixed hyperlipidemia :  She previously did not tolerate simvastatin due to muscle aches.  She currently is managing well with Zetia and pravastatin.  A lipid cascade will be checked.    PLAN:  1.  Labs as outlined.  She will be notified of test results.    2.  Flu shot in the fall.    3.  Annual wellness visit is due in October 4.  Continue current medications.  Periodic monitoring of blood sugars is advised.  Regular walking program is encouraged due to her history of peripheral vascular disease.    Orders Placed This Encounter   Procedures     Glycosylated Hemoglobin A1c     Basic Metabolic Panel     Lipid Cascade     Order Specific Question:   Fasting is required? " "    Answer:   Unknown     HM2(CBC w/o Differential)     Medications Discontinued During This Encounter   Medication Reason     ezetimibe (ZETIA) 10 mg tablet Duplicate order       Return in about 3 months (around 11/14/2020) for Annual physical.    ASSESSED PROBLEMS:  1. Controlled type 2 diabetes mellitus with complication, without long-term current use of insulin (H)  Glycosylated Hemoglobin A1c    Basic Metabolic Panel   2. Thrombocytopenia  HM2(CBC w/o Differential)   3. Essential hypertension     4. Atherosclerosis of native artery of both lower extremities with intermittent claudication (H)     5. Mixed hyperlipidemia  Lipid Cascade   6. Peripheral edema         CHIEF COMPLAINT:  Chief Complaint   Patient presents with     Follow-up     med check       HISTORY OF PRESENT ILLNESS:  Abril Mario is a 86 y.o. female seen for follow-up of type 2 diabetes, hypertension, mixed hyperlipidemia and other concerns.  She remains on metformin.  She has not checked blood sugars lately, but states they were good in the past.  She has not had any adverse effects from the metformin.    She remains on lisinopril-HCTZ 20/25 mg daily, metoprolol XL 25 mg daily, and amlodipine 10 mg daily for hypertension.  Blood pressure has been excellent on this regimen.  She does complain of a \"dry throat\".  This could possibly be an adverse effect from lisinopril.  After discussion, she does not feel it severe enough to warrant changing the medication.  She also notes some edema of the left leg greater than the right by the end of the day.  This does not particularly troubled her.  I suspect amlodipine could be a contributing factor.    She is on Pravachol and Zetia for management of mixed hyperlipidemia.  She previously did not tolerate simvastatin due to myalgias.    She has a history of peripheral vascular disease.  She notes some discomfort in the low back with walking, but no symptoms to suggest claudication currently    REVIEW OF " SYSTEMS:    Comprehensive review of systems is otherwise negative.    PFSH:   and retired.  Lives independently with her .  Former smoker.  Has remained off of cigarettes.  Saying in a [employer] group in the past     TOBACCO USE:  Social History     Tobacco Use   Smoking Status Former Smoker     Years: 10.00     Types: Cigarettes   Smokeless Tobacco Never Used       VITALS:  Vitals:    08/14/20 1014   BP: 132/70   Pulse: 84   SpO2: 96%   Weight: 165 lb (74.8 kg)     Wt Readings from Last 3 Encounters:   08/14/20 165 lb (74.8 kg)   10/01/19 162 lb (73.5 kg)   07/01/19 166 lb (75.3 kg)       PHYSICAL EXAM:  Constitutional:   Reveals an alert pleasant moderately obese woman who does not seem in acute distress.  Vitals: per nursing notes.  HEENT:   Atraumatic  Eyes:   no conjunctival hyperemia  Oropharynx:   Mouth and throat clear, no thrush or exudate.  Neck:  Supple, no carotid bruits or adenopathy.  Healed scar right side of neck related to previous melanoma extraction  Back:  No spine or CVA pain.  Old lower lumbar scar  Thorax:  No bony deformities.  Lungs: Clear to A&P without rales or wheezes.  Respiratory effort normal.  Cardiac:   Regular rate and rhythm, normal S1, S2, no murmur or gallop.  Breasts:   No masses, no axillary adenopathy.  Abdomen:  Soft, active bowel sounds without bruits, mass, or tenderness.  Moderately obese.  Lower abdominal midline scar.  No hernia..  Extremities:   Stasis changes.  Trace edema left lower leg more than right, pulses in the feet  palpable.  No foot ulcers.  Skin:  No jaundice, peripheral cyanosis or lesions to suggest malignancy.  Neuro:  Alert and oriented. .  No gross focal deficits.  Psychiatric:  Memory intact, mood appropriate.    DATA REVIEWED:  Additional History from Old Records Summarized (2): None.  Decision to Obtain Records (1): None.   Radiology Tests Summarized or Ordered (1): None.  Labs Reviewed or Ordered (1):   Medicine Test Summarized or  Ordered (1): None.   Independent Review of EKG or X-RAY(2 each): None.    The visit lasted a total of 25 minutes face to face with the patient. Over 50% of the time was spent counseling and educating the patient about management of type 2 diabetes and cardiovascular risk.      Dragon dictation was used for this note. Speech recognition errors are a possibility.     MEDICATIONS:  Current Outpatient Medications   Medication Sig Dispense Refill     amLODIPine (NORVASC) 10 MG tablet Take 10 mg by mouth daily.       calcium, as carbonate, (OS-ROLA) 500 mg calcium (1,250 mg) tablet Take 1 tablet by mouth daily.       cholecalciferol, vitamin D3, 1,000 unit tablet Take 1,000 Units by mouth daily.       CONTOUR NEXT STRIPS strips Use 1 each As Directed daily. 50 strip 11     ezetimibe (ZETIA) 10 mg tablet TAKE 1 TABLET BY MOUTH ONCE DAILY 90 tablet 3     generic lancets (MICROLET LANCET) Use 1 each As Directed daily. 100 each 3     levothyroxine (SYNTHROID, LEVOTHROID) 150 MCG tablet Take 1 tablet (150 mcg total) by mouth daily. 90 tablet 3     lisinopril-hydrochlorothiazide (PRINZIDE,ZESTORETIC) 20-25 mg per tablet Take 1 tablet by mouth daily. 90 tablet 3     metFORMIN (GLUCOPHAGE) 500 MG tablet Take 1 tablet (500 mg total) by mouth 2 (two) times a day with meals. 180 tablet 3     metoprolol succinate (TOPROL-XL) 25 MG Take 1 tablet (25 mg total) by mouth daily. 90 tablet 3     omeprazole (PRILOSEC) 20 MG capsule Take 1 capsule (20 mg total) by mouth daily before breakfast. 90 capsule 3     potassium gluconate 550 mg (90 mg) tablet Take 90 mg by mouth daily.       pravastatin (PRAVACHOL) 40 MG tablet Take 1 tablet (40 mg total) by mouth daily. 90 tablet 3     No current facility-administered medications for this visit.      Beltran Walker MD

## 2021-06-11 NOTE — PROGRESS NOTES
Assessment: Abril is here today for education / instruction regarding glucose monitoring and the operation of a glucose meter. Abril arrived unaccompanied. She was recently prescribed Metformin to assist with her BG management - prior to that diabetes was solely diet controlled. Abril reported she takes the Metformin each day as indicated with no missed/ skipped doses.   Education was provided to her today on the operation of a blood glucose meter. The following informational points were covered: use of meter, testing technique, how to use a lancet, storage and expiration of testing strips, safe needle disposal and blood glucose targets. She was able to successfully perform a return demonstration  Diabetes presently is optimally controlled    Type of Meter provided: Teresa Contour EZ  BG : 103 mg/dL    Plan: After conferring with Dr Walker it was requested that Abril check her blood sugars 1-2 times each week. She will continue Metformin as currently prescribed.  Abril was instructed to be sure she calls with any questions/ concerns - especially should she begin to have difficulty keeping her blood sugars within recommended target range.    Subjective and Objective:      Abril Pinto is referred by Dr Walker for Diabetes Education.     Lab Results   Component Value Date    HGBA1C 6.1 (H) 06/27/2017         Current diabetes medications:  Metformin 500 mg BID    Goals       Medications            Take diabetes medications as prescribed   7/10/2017            Monitor             Check blood sugars 1-2  times each week  remember to bring meter and log book to all appointments   7/10/2017            Nutrition             Eat 3 balanced meals each day - Monitor carb intake and watch portions    Do not wait longer than 4-5 hours to eat something  Snacks  1-2 choices  ( 15 - 30 grams)   7/10/2017              Follow up:   CDE (certified diabetic educator) prn      Education:     Monitoring   Meter (per above goals):  Assessed, Discussed, Literature provided and Patient returned demonstration  Monitoring: Assessed, Discussed and Literature provided  BG goals: Assessed, Discussed and Literature provided    Nutrition Management  Nutrition Management: Assessed and Discussed  Weight: Assessed and Discussed  Portions/Balance: Assessed and Discussed  Carb ID/Count: Assessed and Discussed   Label Reading: Discussed  Heart Healthy Fats: Discussed  Menu Planning: Assessed and Discussed  Dining Out: Not addressed  Physical Activity: Assessed and Discussed  Medications: Assessed and Discussed  Orals: Assessed and Discussed      Diabetes Disease Process: Assessed and Discussed    Acute Complications: Prevent, Detect, Treat:  Hypoglycemia: Discussed  Hyperglycemia: Discussed    Time spent with the patient: 30 minutes for diabetes education and counseling.   Previous Education: unknown  Visit Type:DSMT  Hours Remaining: DSMT 1.5 and MNT 2      Thank you,  Robyn Covarrubias RN CDE  Diabetes [facility]  7/10/2017

## 2021-06-11 NOTE — PROGRESS NOTES
ASSESSMENT:  1.  Type 2 diabetes mellitus: Her last hemoglobin A1c was up to 7.7.  She is on metformin 500 mg twice daily with good tolerance.  She does not check blood sugars at home and does not have a home blood sugar monitor.  After discussion, she is interested in starting this.  2.  Type 2 diabetes mellitus: She is treated with pravastatin and Zetia.  Last lipids were excellent on this.  Zetia is quite costly despite going generic.  Lipids will be checked today.  If reasonable, she will be given a trial on pravastatin alone.  3.  Hypertension: Good control on current regimen  4.  Low back pain: Radicular symptoms to the right noted at last visit have largely resolved.  She still notes low back stiffness and discomfort aggravated by change in position or prolonged walking.  5.  Hypothyroidism on replacement: She is clinically euthyroid.  TSH will be checked  PLAN:  1.  Labs as outlined for follow-up of type 2 diabetes, hyperlipidemia, hypertension, and hypothyroidism.  She will be notified of test results  2.  Wait on refilling Zadeii until labs are back.  If lipids are fairly good, she will be given a trial and pravastatin alone.  3.  Schedule diabetic education.  Home blood sugar monitoring is advised.  4.  Try magnesium oxide 400 mg daily for nocturnal leg cramps.  5.  Clinic follow-up in 3-6 months based on test results    There are no discontinued medications.        ASSESSED PROBLEMS:  No diagnosis found.    CHIEF COMPLAINT:  Chief Complaint   Patient presents with     Follow-up     elevated blood sugar       HISTORY OF PRESENT ILLNESS:  Abril Mario is a 83 y.o. female presenting to the clinic today for follow up on leg pain.  She states that she has been experiencing leg fatigue, though no acute weakness. She further describes the pain as being provoked after walking for more than a few blocks. The pain is usually akin to cramping in the calves. She also has occasional cramping in the arch of her foot. She  "mentions that about a week ago she had pain wrapping around her back and radiating into her buttock. Though this pain was mollified after a few days she wonders if her back may be responsible for her back pain. She denies any pain in her calves with walking. She recalls that medrol had very little benefit on back or leg pain.     Diabetes:  She has not been monitoring her blood sugar as she has no glucometer. Her last A1c was 7.7 in January. She continues to take pravastatin. She continues 500 mg of metformin twice daily. She has not had any diarrhea.     Health maintenance:  She is due for Tdap.     REVIEW OF SYSTEMS:   She continues to take omeprazole. She denies any stomach upset. She denies any swelling of her ankles.   All other systems are negative.    PFSH:  She recently returned from a trip to [place] for vacation. She saw 8 shows while there.     TOBACCO USE:  History   Smoking Status     Former Smoker     Years: 10.00     Types: Cigarettes   Smokeless Tobacco     Never Used       VITALS:  Vitals:    06/27/17 1514   BP: 110/58   Patient Site: Left Arm   Patient Position: Sitting   Cuff Size: Adult Regular   Weight: 169 lb 8 oz (76.9 kg)   Height: 5' 2\" (1.575 m)     Wt Readings from Last 3 Encounters:   06/27/17 169 lb 8 oz (76.9 kg)   01/24/17 176 lb 3.2 oz (79.9 kg)   08/04/16 170 lb (77.1 kg)       PHYSICAL EXAM:  Constitutional:   Reveals an alert, pleasant female. Does not appear acutely ill. Affect appropriate  Vitals: per nursing notes.  HEENT:  Atraumatic.   Neck:  Old scar on the right. Supple, no carotid bruits or adenopathy.  Back:  No spine or CVA pain.  Thorax:  No bony deformities.  Lungs: Clear to A&P without rales or wheezes.  Respiratory effort normal.  Cardiac:   Regular rate and rhythm, normal PMI normal, no murmur or gallop.  Abdomen:  Moderately obese, soft, active bowel sounds without bruits, mass, or tenderness.  Extremities:  Stasis changes at the ankles, no significant edema. Pulses " in the feet difficult to palpate. Capillary filling normal. No foot ulcers.   Neuro:  Alert and oriented.   No gross focal deficits. Detailed exam not done.   Psychiatric:  Memory intact, mood appropriate.    QUALITY MEASURES:  The following high BMI interventions were performed this visit: encouragement to exercise    ADDITIONAL HISTORY SUMMARIZED (2): None.  DECISION TO OBTAIN EXTRA INFORMATION (1): Care everywhere accessed.   RADIOLOGY TESTS (1): None.  LABS (1): Labs reviewed from 1/2017.  MEDICINE TESTS (1): None.  INDEPENDENT REVIEW (2 each): None.     The visit lasted a total of 24 minutes face to face with the patient. Over 50% of the time was spent counseling and educating the patient about back pain.    IJuan, am scribing for and in the presence of, Dr. Walker.    I, Dr. Walker, personally performed the services described in this documentation, as scribed by Juan Francois in my presence, and it is both accurate and complete.    Dragon dictation was used for this note.  Speech recognition errors are a possibility.    MEDICATIONS:  Current Outpatient Prescriptions   Medication Sig Dispense Refill     amLODIPine (NORVASC) 10 MG tablet TAKE ONE TABLET BY MOUTH ONCE DAILY 90 tablet 3     atenolol (TENORMIN) 25 MG tablet TAKE ONE TABLET BY MOUTH ONCE DAILY 90 tablet 3     calcium-vitamin D (CALCIUM-VITAMIN D) 500 mg(1,250mg) -200 unit per tablet Take 1 tablet by mouth 2 (two) times a day with meals.       cholecalciferol, vitamin D3, 5,000 unit Tab Take 5,000 Units by mouth daily.       levothyroxine (SYNTHROID, LEVOTHROID) 125 MCG tablet TAKE ONE TABLET BY MOUTH ONCE DAILY 90 tablet 3     lisinopril-hydrochlorothiazide (PRINZIDE,ZESTORETIC) 20-25 mg per tablet TAKE ONE TABLET BY MOUTH ONCE DAILY 90 tablet 3     metFORMIN (GLUCOPHAGE) 500 MG tablet Take 1 tablet (500 mg total) by mouth 2 (two) times a day with meals. 180 tablet 3     methylPREDNISolone (MEDROL DOSEPACK) 4 mg tablet follow package  directions 21 tablet 0     omeprazole (PRILOSEC) 20 MG capsule TAKE ONE CAPSULE BY MOUTH ONCE DAILY 90 capsule 3     potassium gluconate 550 mg (90 mg) tablet Take 90 mg by mouth daily.       pravastatin (PRAVACHOL) 40 MG tablet Take 1 tablet (40 mg total) by mouth daily. 90 tablet 0     ZETIA 10 mg tablet TAKE ONE TABLET BY MOUTH ONCE DAILY 90 tablet 3     No current facility-administered medications for this visit.        Total data points: 2.

## 2021-06-12 NOTE — TELEPHONE ENCOUNTER
Refill Approved    Rx renewed per Medication Renewal Policy. Medication was last renewed on 10/1/19.    Edwina Chin, [facility] Triage/Med Refill 10/29/2020     Requested Prescriptions   Pending Prescriptions Disp Refills     pravastatin (PRAVACHOL) 40 MG tablet [Pharmacy Med Name: Pravastatin Sodium 40 MG Oral Tablet] 90 tablet 0     Sig: Take 1 tablet by mouth once daily       Statins Refill Protocol (Hmg CoA Reductase Inhibitors) Passed - 10/27/2020 10:56 AM        Passed - PCP or prescribing provider visit in past 12 months      Last office visit with prescriber/PCP: 8/14/2020 Beltran Walker MD OR same dept: Visit date not found OR same specialty: 8/14/2020 Beltran Walker MD  Last physical: 10/1/2019 Last MTM visit: Visit date not found   Next visit within 3 mo: Visit date not found  Next physical within 3 mo: Visit date not found  Prescriber OR PCP: Beltran Walker MD  Last diagnosis associated with med order: 1. Hyperlipidemia  - pravastatin (PRAVACHOL) 40 MG tablet [Pharmacy Med Name: Pravastatin Sodium 40 MG Oral Tablet]; Take 1 tablet by mouth once daily  Dispense: 90 tablet; Refill: 0    If protocol passes may refill for 12 months if within 3 months of last provider visit (or a total of 15 months).

## 2021-06-12 NOTE — TELEPHONE ENCOUNTER
Refill Approved    Rx renewed per Medication Renewal Policy. Medication was last renewed on 10/1/19.    Edwina Chin, [facility] Triage/Med Refill 10/7/2020     Requested Prescriptions   Pending Prescriptions Disp Refills     lisinopriL-hydrochlorothiazide (PRINZIDE,ZESTORETIC) 20-25 mg per tablet [Pharmacy Med Name: Lisinopril-hydroCHLOROthiazide 20-25 MG Oral Tablet] 90 tablet 0     Sig: Take 1 tablet by mouth once daily       Diuretics/Combination Diuretics Refill Protocol  Passed - 10/5/2020 11:35 AM        Passed - Visit with PCP or prescribing provider visit in past 12 months     Last office visit with prescriber/PCP: 8/14/2020 Beltran Walker MD OR same dept: Visit date not found OR same specialty: 8/14/2020 Beltran Walker MD  Last physical: 10/1/2019 Last MTM visit: Visit date not found   Next visit within 3 mo: Visit date not found  Next physical within 3 mo: Visit date not found  Prescriber OR PCP: Beltran Walker MD  Last diagnosis associated with med order: 1. Essential hypertension  - lisinopriL-hydrochlorothiazide (PRINZIDE,ZESTORETIC) 20-25 mg per tablet [Pharmacy Med Name: Lisinopril-hydroCHLOROthiazide 20-25 MG Oral Tablet]; Take 1 tablet by mouth once daily  Dispense: 90 tablet; Refill: 0    If protocol passes may refill for 12 months if within 3 months of last provider visit (or a total of 15 months).             Passed - Serum Potassium in past 12 months      Lab Results   Component Value Date    Potassium 3.8 10/06/2020             Passed - Serum Sodium in past 12 months      Lab Results   Component Value Date    Sodium 131 (L) 10/06/2020             Passed - Blood pressure on file in past 12 months     BP Readings from Last 1 Encounters:   10/06/20 166/71             Passed - Serum Creatinine in past 12 months      Creatinine   Date Value Ref Range Status   10/06/2020 0.70 0.60 - 1.10 mg/dL Final

## 2021-06-12 NOTE — TELEPHONE ENCOUNTER
Refill Approved    Rx renewed per Medication Renewal Policy. Medication was last renewed on 10/1/19.    Edwina Chin, [facility] Triage/Med Refill 10/14/2020     Requested Prescriptions   Pending Prescriptions Disp Refills     metoprolol succinate (TOPROL-XL) 25 MG [Pharmacy Med Name: Metoprolol Succinate ER 25 MG Oral Tablet Extended Release 24 Hour] 90 tablet 0     Sig: Take 1 tablet by mouth once daily       Beta-Blockers Refill Protocol Passed - 10/12/2020  1:12 PM        Passed - PCP or prescribing provider visit in past 12 months or next 3 months     Last office visit with prescriber/PCP: 8/14/2020 Beltran Walker MD OR same dept: Visit date not found OR same specialty: 8/14/2020 Beltran Walker MD  Last physical: 10/1/2019 Last MTM visit: Visit date not found   Next visit within 3 mo: Visit date not found  Next physical within 3 mo: Visit date not found  Prescriber OR PCP: Beltran Walker MD  Last diagnosis associated with med order: 1. Hypertension  - metoprolol succinate (TOPROL-XL) 25 MG [Pharmacy Med Name: Metoprolol Succinate ER 25 MG Oral Tablet Extended Release 24 Hour]; Take 1 tablet by mouth once daily  Dispense: 90 tablet; Refill: 0    If protocol passes may refill for 12 months if within 3 months of last provider visit (or a total of 15 months).             Passed - Blood pressure filed in past 12 months     BP Readings from Last 1 Encounters:   10/06/20 166/71

## 2021-06-12 NOTE — TELEPHONE ENCOUNTER
"Pt calls to report \"trouble urinating.\"  Onset 4-to-5 days ago.  \"Just dribbles out.\"  Today feeling \"sensation of pressure in lower abdomen.\"    Advised immediate ED eval.  Pt agrees to plan.  States intention to go to [facility].    Diana Franco RN  [facility] Triage     Reason for Disposition    [1] Unable to urinate (or only a few drops) > 4 hours AND     [2] bladder feels very full (e.g., palpable bladder or strong urge to urinate)    Additional Information    Negative: Shock suspected (e.g., cold/pale/clammy skin, too weak to stand, low BP, rapid pulse)    Negative: Sounds like a life-threatening emergency to the triager    Negative: Followed a genital area injury    Negative: Followed a genital area injury (penis, scrotum)    Negative: Vaginal discharge    Negative: Pus (white, yellow) or bloody discharge from end of penis    Negative: [1] Taking antibiotic for urinary tract infection (UTI) AND [2] female    Negative: [1] Taking antibiotic for urinary tract infection (UTI) AND [2] male    Negative: [1] Discomfort (pain, burning or stinging) when passing urine AND [2] pregnant    Negative: [1] Discomfort (pain, burning or stinging) when passing urine AND [2] postpartum (from 0 to 6 weeks after delivery)    Negative: [1] Discomfort (pain, burning or stinging) when passing urine AND [2] female    Negative: [1] Discomfort (pain, burning or stinging) when passing urine AND [2] male    Negative: Pain or itching in the vulvar area    Negative: Pain in scrotum is main symptom    Negative: Blood in the urine is main symptom    Negative: Symptoms arising from use of a urinary catheter (Beltran or Coude)    Protocols used: URINARY SYMPTOMS-A-AH    ____________________    COVID 19 Nurse Triage Plan/Patient Instructions    Please be aware that novel coronavirus (COVID-19) may be circulating in the community. If you develop symptoms such as fever, cough, or SOB or if you have concerns about the presence of " "another infection including coronavirus (COVID-19), please contact your health care provider or visit [email or web address].     Disposition/Instructions    Additional COVID19 information to add for patients.   How can I protect others?  If you have symptoms (fever, cough, body aches or trouble breathing): Stay home and away from others (self-isolate) until:    At least 10 days have passed since your symptoms started, And     You ve had no fever--and no medicine that reduces fever--for 1 full day (24 hours), And      Your other symptoms have resolved (gotten better).     If you don t have symptoms, but a test showed that you have COVID-19 (you tested positive):    Stay home and away from others (self-isolate). Follow the tips under \"How do I self-isolate?\" below for 10 days (20 days if you have a weak immune system).    You don't need to be retested for COVID-19 before going back to school or work. As long as you're fever-free and feeling better, you can go back to school, work and other activities after waiting the 10 or 20 days.     How do I self-isolate?    Stay in your own room, even for meals. Use your own bathroom if you can.     Stay away from others in your home. No hugging, kissing or shaking hands. No visitors.    Don t go to work, school or anywhere else.     Clean  high touch  surfaces often (doorknobs, counters, handles, etc.). Use a household cleaning spray or wipes. You ll find a full list on the EPA website:  www.epa.gov/pesticide-registration/list-n-disinfectants-use-against-sars-cov-2.    Cover your mouth and nose with a mask, tissue or washcloth to avoid spreading germs.    Wash your hands and face often. Use soap and water.    Caregivers in these groups are at risk for severe illness due to COVID-19:  o People 65 years and older  o People who live in a nursing home or long-term care facility  o People with chronic disease (lung, heart, cancer, diabetes, kidney, liver, immunologic)  o People who have a " weakened immune system, including those who:  - Are in cancer treatment  - Take medicine that weakens the immune system, such as corticosteroids  - Had a bone marrow or organ transplant  - Have an immune deficiency  - Have poorly controlled HIV or AIDS  - Are obese (body mass index of 40 or higher)  - Smoke regularly    Caregivers should wear gloves while washing dishes, handling laundry and cleaning bedrooms and bathrooms.    Use caution when washing and drying laundry: Don t shake dirty laundry, and use the warmest water setting that you can.    For more tips, go to www.cdc.gov/coronavirus/2019-ncov/downloads/10Things.pdf.    How can I take care of myself?  1. Get lots of rest. Drink extra fluids (unless a doctor has told you not to).     2. Take Tylenol (acetaminophen) for fever or pain. If you have liver or kidney problems, ask your family doctor if it s okay to take Tylenol.     Adults can take either:     650 mg (two 325 mg pills) every 4 to 6 hours, or     1,000 mg (two 500 mg pills) every 8 hours as needed.     Note: Don t take more than 3,000 mg in one day.   Acetaminophen is found in many medicines (both prescribed and over-the-counter medicines). Read all labels to be sure you don t take too much.     For children, check the Tylenol bottle for the right dose. The dose is based on the child s age or weight.    3. If you have other health problems (like cancer, heart failure, an organ transplant or severe kidney disease): Call your specialty clinic if you don t feel better in the next 2 days.    4. Know when to call 911: Emergency warning signs include:    Trouble breathing or shortness of breath    Pain or pressure in the chest that doesn t go away    Feeling confused like you haven t felt before, or not being able to wake up    Bluish-colored lips or face    What are the symptoms of COVID-19?     The most common symptoms are cough, fever and trouble breathing.     Less common symptoms include body aches,  chills, diarrhea (loose, watery poops), fatigue (feeling very tired), headache, runny nose, sore throat and loss of smell.    COVID-19 can cause severe coughing (bronchitis) and lung infection (pneumonia).    How does it spread?     The virus may spread when a person coughs or sneezes into the air. The virus can travel about 6 feet this way, and it can live on surfaces.      Common  (household disinfectants) will kill the virus.    Who is at risk?  Anyone can catch COVID-19 if they re around someone who has the virus.    How can others protect themselves?     Stay away from people who have COVID-19 (or symptoms of COVID-19).    Wash hands often with soap and water. Or, use hand  with at least 60% alcohol.    Avoid touching the eyes, nose or mouth.     Wear a face mask when you go out in public, when sick or when caring for a sick person.    Where can I get more information?    [facility]: About COVID-19: [email or web address]    CDC: What to Do If You re Sick: www.cdc.gov/coronavirus/2019-ncov/about/steps-when-sick.html    CDC: Ending Home Isolation: www.cdc.gov/coronavirus/2019-ncov/hcp/disposition-in-home-patients.html     CDC: Caring for Someone: www.cdc.gov/coronavirus/2019-ncov/if-you-are-sick/care-for-someone.html     [facility]: Interim Guidance for Hospital Discharge to Home: www.health.[facility].[place]/diseases/coronavirus/hcp/hospdischarge.pdf    [facility] clinical trials (COVID-19 research studies): [email or web address].[facility]-clinical-trials     Below are the COVID-19 hotlines at the [place] Department of [facility] ([facility]). Interpreters are available.   o For health questions: Call 246-005-9600 or 1-119.131.7707 (7 a.m. to 7 p.m.)  o For questions about schools and childcare: Call 950-103-5328 or 1-187.547.1929 (7 a.m. to 7 p.m.)              Thank you for taking steps to prevent the spread of this virus.  o Limit your contact with others.  o Wear a simple mask to cover  your cough.  o Wash your hands well and often.    Resources    [facility]: About COVID-19: [email or web address]    CDC: What to Do If You're Sick: www.cdc.gov/coronavirus/2019-ncov/about/steps-when-sick.html    CDC: Ending Home Isolation: www.cdc.gov/coronavirus/2019-ncov/hcp/disposition-in-home-patients.html     CDC: Caring for Someone: www.cdc.gov/coronavirus/2019-ncov/if-you-are-sick/care-for-someone.html     [facility]: Interim Guidance for Hospital Discharge to Home: www.[facility].[place]/diseases/coronavirus/hcp/hospdischarge.pdf    [facility] clinical trials (COVID-19 research studies): clinicalaffairs.[facility]-clinical-trials     Below are the COVID-19 hotlines at the [place] Department of [facility] ([facility]). Interpreters are available.   o For health questions: Call 891-226-1757 or 1-664.692.6308 (7 a.m. to 7 p.m.)  o For questions about schools and childcare: Call 154-430-0020 or 1-458.169.6075 (7 a.m. to 7 p.m.)

## 2021-06-12 NOTE — TELEPHONE ENCOUNTER
RN cannot approve Refill Request    RN can NOT refill this medication historical medication requested. Last office visit: Visit date not found Last Physical: Visit date not found Last MTM visit: Visit date not found Last visit same specialty: 8/14/2020 Beltran Walker MD.  Next visit within 3 mo: Visit date not found  Next physical within 3 mo: Visit date not found      Edwina Chin, [facility] Triage/Med Refill 10/7/2020    Requested Prescriptions   Pending Prescriptions Disp Refills     amLODIPine (NORVASC) 10 MG tablet [Pharmacy Med Name: amLODIPine Besylate 10 MG Oral Tablet] 90 tablet 0     Sig: Take 1 tablet by mouth once daily       Calcium-Channel Blockers Protocol Passed - 10/5/2020 11:34 AM        Passed - PCP or prescribing provider visit in past 12 months or next 3 months     Last office visit with prescriber/PCP: Visit date not found OR same dept: Visit date not found OR same specialty: 8/14/2020 Beltran Walker MD  Last physical: Visit date not found Last MTM visit: Visit date not found   Next visit within 3 mo: Visit date not found  Next physical within 3 mo: Visit date not found  Prescriber OR PCP: Jennifer Cuello MD  Last diagnosis associated with med order: There are no diagnoses linked to this encounter.  If protocol passes may refill for 12 months if within 3 months of last provider visit (or a total of 15 months).             Passed - Blood pressure filed in past 12 months     BP Readings from Last 1 Encounters:   10/06/20 166/71

## 2021-06-13 NOTE — PROGRESS NOTES
Assessment and Plan:   Annual wellness assessment: Abril lives independently with her .  She has a healthcare directive which was reviewed and will be filed with her chart.  She scores 4 on mini cog and has no evidence for cognitive decline.  Health risk assessment was reviewed.  Overall, she seems to be doing well at home    1. Diabetes type 2, controlled  Hemoglobin A1c is excellent at 6.3.  She will continue her current metformin dose  - Glycosylated Hemoglobin A1c  - [facility] RED  - Glycosylated Hemoglobin A1c  - Microalbumin, Random Urine  - Comprehensive Metabolic Panel  - Lipid Cascade    2. Hypothyroidism  She appears clinically euthyroid.  TSH will be checked  - Thyroid Stimulating Hormone (TSH)    3. Hypomagnesemia  Uses a magnesium supplement level will be checked  - Magnesium    4. Essential hypertension  Blood pressure control is good on current regimen.  She is treated with amlodipine, atenolol, and lisinopril HCTZ  - Comprehensive Metabolic Panel  - Urinalysis-UC if Indicated    5. Mixed hyperlipidemia  She is treated with Zetia and pravastatin.  She did have difficulties with muscle aches on simvastatin in the past  - Lipid Cascade  6.  Claudication: He had stenting for this several years ago.  She is not sure it was of much benefit.  She now notes calf pain on walking one half block.  She would prefer to avoid any other invasive management.  After discussion, she will try Pletal.    7.  History of skin cancer: Regular dermatology follow-up was advised    8.  Reflux esophagitis: Good control with omeprazole.  She notes rapid worsening if she misses it    Plan:  1.  The patient's current medical problems were reviewed.  2.  Labs as outlined.  She will be notified of test results  3.  She may switch to to checking mammograms every other year.  Density recheck is not required for this  4.  Start Pletal 100 mg twice daily.  Note effect on symptoms of claudication.  Regular walking program also was  encouraged  5.  Clinic follow-up in 4-6 months  The following health maintenance schedule was reviewed with the patient and provided in printed form in the after visit summary:   Health Maintenance   Topic Date Due     DIABETES OPHTHALMOLOGY EXAM  10/17/1943     DIABETES HEMOGLOBIN A1C  12/27/2017     DIABETES FOLLOW-UP  05/07/2018     DIABETES URINE MICROALBUMIN  06/27/2018     DXA SCAN  08/09/2018     DIABETES FOOT EXAM  11/07/2018     FALL RISK ASSESSMENT  11/07/2018     ADVANCE DIRECTIVES DISCUSSED WITH PATIENT  12/16/2019     TD 18+ HE  08/17/2026     PNEUMOCOCCAL POLYSACCHARIDE VACCINE AGE 65 AND OVER  Completed     INFLUENZA VACCINE RULE BASED  Completed     PNEUMOCOCCAL CONJUGATE VACCINE FOR ADULTS (PCV13 OR PREVNAR)  Completed     ZOSTER VACCINE  Completed        Subjective:   Chief Complaint: Abril Pinto is an 84 y.o. female here for an Annual Wellness visit.   HPI:      Claudication: She does get calf pain with walking, which is worse if she is trying to walk fast or carry something heavy. She can walk for about half of a block before her legs tighten.     GERD: She continues on omeprazole 20mg daily to control esophageal reflux. She has concerns about long-term use of the medication.     Health Maintenance: Her last mammogram was 7/11/16 and was normal. She had a flu shot this year and is up to date on other immunizations.     Review of Systems:    She has intermittent sciatic pain on the left.   She does have occasional discomfort in her left breast. A mammogram of 7/11/16 was normal.   Please see above.  The rest of the review of systems are negative for all systems.    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 No pertinent past medical history.   Past Surgical History:   Procedure Laterality Date     HERNIA REPAIR       HYSTERECTOMY       TX JUSTICE W/O FACETEC FORAMOT/DSKC 1/2 VRT SEG, LUMBAR      Description: Laminectomy Decompressive Up To Two Lumbar Segments;  Proc Date: 09/01/2008;  Comments: for spinal stenosis  At L4-L5     TX REVSC OPN/PRQ FEM/POP W/STNT/ANGIOP SM VSL      Description: PTA Femoral-Popliteal Initial Stenosis With Stent;  Proc Date: 03/20/2014;     TX TOTAL ABDOM HYSTERECTOMY      Description: Hysterectomy;  Recorded: 04/02/2008;     TX TOTAL ABDOM HYSTERECTOMY      Description: Hysterectomy;  Recorded: 04/27/2009;     SALPINGOOPHORECTOMY Bilateral      SPINE SURGERY      laminectomy decompressive 2 lumbar segments     stenosis      PTA femoral-popliteal initial with stent      Family History   Problem Relation Age of Onset     Cancer       adenocarcinoma of the pancreas     Leukemia       Hypothyroidism       Breast cancer Sister       Social History     Social History     Marital status:      Spouse name: N/A     Number of children: N/A     Years of education: N/A     Occupational History     Not on file.     Social History Main Topics     Smoking status: Former Smoker     Years: 10.00     Types: Cigarettes     Smokeless tobacco: Never Used     Alcohol use Yes     Drug use: No     Sexual activity: Not on file     Other Topics Concern     Not on file     Social History Narrative      Current Outpatient Prescriptions   Medication Sig Dispense Refill     amLODIPine (NORVASC) 10 MG tablet TAKE ONE TABLET BY MOUTH ONCE DAILY 90 tablet 3     atenolol (TENORMIN) 25 MG tablet TAKE ONE TABLET BY MOUTH ONCE DAILY 90 tablet 3     calcium-vitamin D (CALCIUM-VITAMIN D) 500  "mg(1,250mg) -200 unit per tablet Take 1 tablet by mouth 2 (two) times a day with meals.       cholecalciferol, vitamin D3, 5,000 unit Tab Take 5,000 Units by mouth daily.       CONTOUR NEXT STRIPS strips Use 1 each As Directed daily. 50 strip 11     ezetimibe (ZETIA) 10 mg tablet TAKE ONE TABLET BY MOUTH ONCE DAILY 90 tablet 3     generic lancets (MICROLET LANCET) Use 1 each As Directed daily. 100 each 3     levothyroxine (SYNTHROID, LEVOTHROID) 125 MCG tablet TAKE ONE TABLET BY MOUTH ONCE DAILY 90 tablet 3     lisinopril-hydrochlorothiazide (PRINZIDE,ZESTORETIC) 20-25 mg per tablet TAKE ONE TABLET BY MOUTH ONCE DAILY 90 tablet 3     magnesium oxide (MAGOX) 400 mg tablet Take 1 tablet (400 mg total) by mouth daily. 200 tablet 1     metFORMIN (GLUCOPHAGE) 500 MG tablet Take 1 tablet (500 mg total) by mouth 2 (two) times a day with meals. 180 tablet 3     metoprolol succinate (TOPROL-XL) 25 MG Take 1 tablet (25 mg total) by mouth daily. 90 tablet 3     omeprazole (PRILOSEC) 20 MG capsule TAKE ONE CAPSULE BY MOUTH ONCE DAILY 90 capsule 3     potassium gluconate 550 mg (90 mg) tablet Take 90 mg by mouth daily.       pravastatin (PRAVACHOL) 40 MG tablet TAKE ONE TABLET BY MOUTH ONCE DAILY 90 tablet 2     No current facility-administered medications for this visit.       Objective:   Vital Signs:   Visit Vitals     /74 (Patient Site: Left Arm, Patient Position: Sitting, Cuff Size: Adult Regular)     Pulse 80     Ht 5' 2\" (1.575 m)     Wt 171 lb 6.4 oz (77.7 kg)     Breastfeeding No     BMI 31.35 kg/m2        VisionScreening:  No exam data present     PHYSICAL EXAM  Constitutional:   Reveals an alert, pleasant, talkative woman. Affect appropriate. Does not seem acutely ill. Vitals: per nursing notes.  HEENT:  Atraumatic. Ears:  External canals, TMs clear.    Eyes:  Pupils constricted. Fundi difficult to visualize. EOMs full.  Oropharynx:   Mouth and throat clear, no thrush or exudate.  Neck:  Old scar right neck. No " bruits, thyromegaly, or adenopathy.  Back:  No spine or CVA pain.  Lungs: Clear to A&P without rales or wheezes.  Respiratory effort normal.  Cardiac:   Regular rate and rhythm, normal S1, S2, no murmur or gallop.  Breasts:   No masses, no axillary adenopathy.  Abdomen:  Bilateral inguinal scars. Moderately obese. No bruits, mass, or tenderness. Femoral pulses intact.   : Not done.    Extremities: Stasis changes lower legs. Pulses cannot be palpated in the feet, but feet are warm with normal capillary filling. No foot ulcers.     Skin:  Stasis changes lower legs. No lesions to suggest malignancy.   Neuro:  Alert and talkative. No cranial, motor, or sensory deficits. No evidence for stocking neuropathy on diabetic foot exam.       ADDITIONAL HISTORY SUMMARIZED (2): Reviewed note of Robyn Covarrubias, 7/10/17.  DECISION TO OBTAIN EXTRA INFORMATION (1): None.   RADIOLOGY TESTS (1): Reviewed mammogram of 7/11/16 and DXA of 8/9/16.   LABS (1): Labs ordered.   MEDICINE TESTS (1): None.  INDEPENDENT REVIEW (2 each): None.     The visit lasted a total of 20 minutes face to face with the patient. Over 50% of the time was spent counseling and educating the patient about health maintenance.    I, Kennedy Ahumada, am scribing for and in the presence of, Dr. Walker.    I, Dr. CESAR Walker, personally performed the services described in this documentation, as scribed by Kennedy Ahumada in my presence, and it is both accurate and complete.      Assessment Results 11/7/2017   Activities of Daily Living No help needed   Instrumental Activities of Daily Living No help needed   Get Up and Go Score Less than 12 seconds   Mini Cog Total Score 4   Some recent data might be hidden     A Mini-Cog score of 0-2 suggests the possibility of dementia, score of 3-5 suggests no dementia    Identified Health Risks:     She is at risk for lack of exercise and has been provided with information to increase physical activity for the benefit of her  well-being.  Patient's advanced directive was discussed and I am comfortable with the patient's wishes.

## 2021-06-13 NOTE — TELEPHONE ENCOUNTER
Component      Latest Ref Rng & Units 11/9/2020   COVID-19 VIRUS SPECIMEN SOURCE       Nasopharyngeal   2019-nCOV       Not Detected     Coronavirus (COVID-19) Notification    Lab Result   Lab test 2019-nCoV rRt-PCR OR SARS-COV-2 PCR    Nasopharyngeal AND/OR Oropharyngeal swab is NEGATIVE for 2019-nCoV RNA [OR] SARS-COV-2 RNA (COVID-19) RNA    Your result was negative. This means that we didn't find the virus that causes COVID-19 in your sample. A test may show negative when you do actually have the virus. This can happen when the virus is in the early stages of infection, before you feel illness symptoms.    If you have symptoms   Stay home and away from others (self-isolate) until you meet ALL of the guidelines below:    You've had no fever--and no medicine that reduces fever--for 1 full day (24 hours). And      Your other symptoms have gotten better. For example, your cough or breathing has improved. And     At least 10 days have passed since your symptoms started. (If you ve been told by a doctor that you have a weak immune system, wait 20 days.)     During this time:    Stay home. Don't go to work, school or anywhere else.     Stay in your own room, including for meals. Use your own bathroom if you can.    Stay away from others in your home. No hugging, kissing or shaking hands. No visitors.    Clean  high touch  surfaces often (doorknobs, counters, handles, etc.). Use a household cleaning spray or wipes. You can find a full list on the EPA website at www.epa.gov/pesticide-registration/list-n-disinfectants-use-against-sars-cov-2.    Cover your mouth and nose with a mask, tissue or other face covering to avoid spreading germs.    Wash your hands and face often with soap and water.    Going back to work  Check with your employer for any guidelines to follow for going back to work.  You are sent a letter for your Employer which will serve as formal document notice that you, the employee, tested negative for  COVID-19, as of the testing date shown above.    If your symptoms worsen or other concerning symptoms, contact PCP, oncare or consider returning to Emergency Dept.    Where can I get more information?    [facility]: [email or web address]    Coronavirus Basics: www.[email or web address].[email or web address]/diseases/coronavirus/basics.html    [facility] Hotline (978-885-6611)    Radha Singh RN 11/13/20 11:13 AM  [facility] Nurse Advisor

## 2021-06-14 NOTE — TELEPHONE ENCOUNTER
Refill Approved    Rx renewed per Medication Renewal Policy. Medication was last renewed on 10/1/19.    Edwina Chni, [facility] Triage/Med Refill 12/29/2020     Requested Prescriptions   Pending Prescriptions Disp Refills     levothyroxine (SYNTHROID, LEVOTHROID) 150 MCG tablet [Pharmacy Med Name: Levothyroxine Sodium 150 MCG Oral Tablet] 90 tablet 0     Sig: Take 1 tablet by mouth once daily       Thyroid Hormones Protocol Failed - 12/28/2020  7:50 AM        Failed - TSH on file in past 12 months for patient age 12 & older     TSH   Date Value Ref Range Status   10/01/2019 0.36 0.30 - 5.00 uIU/mL Final                   Passed - Provider visit in past 12 months or next 3 months     Last office visit with prescriber/PCP: 8/14/2020 Beltran Walker MD OR same dept: Visit date not found OR same specialty: 8/14/2020 Beltran Walker MD  Last physical: 10/1/2019 Last MTM visit: Visit date not found   Next visit within 3 mo: Visit date not found  Next physical within 3 mo: Visit date not found  Prescriber OR PCP: Beltran Walker MD  Last diagnosis associated with med order: 1. Hypothyroidism  - levothyroxine (SYNTHROID, LEVOTHROID) 150 MCG tablet [Pharmacy Med Name: Levothyroxine Sodium 150 MCG Oral Tablet]; Take 1 tablet by mouth once daily  Dispense: 90 tablet; Refill: 0    2. Gastroesophageal reflux disease without esophagitis  - omeprazole (PRILOSEC) 20 MG capsule [Pharmacy Med Name: Omeprazole 20 MG Oral Capsule Delayed Release]; TAKE 1 CAPSULE BY MOUTH ONCE DAILY BEFORE BREAKFAST  Dispense: 90 capsule; Refill: 0    If protocol passes may refill for 12 months if within 3 months of last provider visit (or a total of 15 months).                omeprazole (PRILOSEC) 20 MG capsule [Pharmacy Med Name: Omeprazole 20 MG Oral Capsule Delayed Release] 90 capsule 0     Sig: TAKE 1 CAPSULE BY MOUTH ONCE DAILY BEFORE BREAKFAST       GI Medications Refill Protocol Passed - 12/28/2020  7:50 AM        Passed - PCP or  prescribing provider visit in last 12 or next 3 months.     Last office visit with prescriber/PCP: 8/14/2020 Beltran Walker MD OR same dept: Visit date not found OR same specialty: 8/14/2020 Beltran Walker MD  Last physical: 10/1/2019 Last MTM visit: Visit date not found   Next visit within 3 mo: Visit date not found  Next physical within 3 mo: Visit date not found  Prescriber OR PCP: Beltran Walker MD  Last diagnosis associated with med order: 1. Hypothyroidism  - levothyroxine (SYNTHROID, LEVOTHROID) 150 MCG tablet [Pharmacy Med Name: Levothyroxine Sodium 150 MCG Oral Tablet]; Take 1 tablet by mouth once daily  Dispense: 90 tablet; Refill: 0    2. Gastroesophageal reflux disease without esophagitis  - omeprazole (PRILOSEC) 20 MG capsule [Pharmacy Med Name: Omeprazole 20 MG Oral Capsule Delayed Release]; TAKE 1 CAPSULE BY MOUTH ONCE DAILY BEFORE BREAKFAST  Dispense: 90 capsule; Refill: 0    If protocol passes may refill for 12 months if within 3 months of last provider visit (or a total of 15 months).

## 2021-06-14 NOTE — TELEPHONE ENCOUNTER
RN cannot approve Refill Request    RN can NOT refill this medication Protocol failed and NO refill given. Last office visit: 8/14/2020 Beltran Walker MD Last Physical: 10/1/2019 Last MTM visit: Visit date not found Last visit same specialty: 8/14/2020 Beltran Walker MD.  Next visit within 3 mo: Visit date not found  Next physical within 3 mo: Visit date not found      Edwina Chin, [facility] Triage/Med Refill 12/29/2020    Requested Prescriptions   Pending Prescriptions Disp Refills     levothyroxine (SYNTHROID, LEVOTHROID) 150 MCG tablet [Pharmacy Med Name: Levothyroxine Sodium 150 MCG Oral Tablet] 90 tablet 0     Sig: Take 1 tablet by mouth once daily       Thyroid Hormones Protocol Failed - 12/28/2020  7:50 AM        Failed - TSH on file in past 12 months for patient age 12 & older     TSH   Date Value Ref Range Status   10/01/2019 0.36 0.30 - 5.00 uIU/mL Final                   Passed - Provider visit in past 12 months or next 3 months     Last office visit with prescriber/PCP: 8/14/2020 Beltran Walker MD OR same dept: Visit date not found OR same specialty: 8/14/2020 Beltran Walker MD  Last physical: 10/1/2019 Last MTM visit: Visit date not found   Next visit within 3 mo: Visit date not found  Next physical within 3 mo: Visit date not found  Prescriber OR PCP: Beltran Walker MD  Last diagnosis associated with med order: 1. Hypothyroidism  - levothyroxine (SYNTHROID, LEVOTHROID) 150 MCG tablet [Pharmacy Med Name: Levothyroxine Sodium 150 MCG Oral Tablet]; Take 1 tablet by mouth once daily  Dispense: 90 tablet; Refill: 0    2. Gastroesophageal reflux disease without esophagitis  - omeprazole (PRILOSEC) 20 MG capsule; TAKE 1 CAPSULE BY MOUTH ONCE DAILY BEFORE BREAKFAST  Dispense: 90 capsule; Refill: 2    If protocol passes may refill for 12 months if within 3 months of last provider visit (or a total of 15 months).              Signed Prescriptions Disp Refills    omeprazole (PRILOSEC) 20 MG  capsule 90 capsule 2     Sig: TAKE 1 CAPSULE BY MOUTH ONCE DAILY BEFORE BREAKFAST       GI Medications Refill Protocol Passed - 12/28/2020  7:50 AM        Passed - PCP or prescribing provider visit in last 12 or next 3 months.     Last office visit with prescriber/PCP: 8/14/2020 Beltran Walker MD OR same dept: Visit date not found OR same specialty: 8/14/2020 Beltran Walker MD  Last physical: 10/1/2019 Last MTM visit: Visit date not found   Next visit within 3 mo: Visit date not found  Next physical within 3 mo: Visit date not found  Prescriber OR PCP: Beltran Walker MD  Last diagnosis associated with med order: 1. Hypothyroidism  - levothyroxine (SYNTHROID, LEVOTHROID) 150 MCG tablet [Pharmacy Med Name: Levothyroxine Sodium 150 MCG Oral Tablet]; Take 1 tablet by mouth once daily  Dispense: 90 tablet; Refill: 0    2. Gastroesophageal reflux disease without esophagitis  - omeprazole (PRILOSEC) 20 MG capsule; TAKE 1 CAPSULE BY MOUTH ONCE DAILY BEFORE BREAKFAST  Dispense: 90 capsule; Refill: 2    If protocol passes may refill for 12 months if within 3 months of last provider visit (or a total of 15 months).

## 2021-06-14 NOTE — TELEPHONE ENCOUNTER
Refill Approved    Rx renewed per Medication Renewal Policy. Medication was last renewed on 12/5/19, last OV 8/14/20.    Zaynab Begum, [facility] Triage/Med Refill 1/1/2021     Requested Prescriptions   Pending Prescriptions Disp Refills     ezetimibe (ZETIA) 10 mg tablet [Pharmacy Med Name: Ezetimibe 10 MG Oral Tablet] 90 tablet 0     Sig: Take 1 tablet by mouth once daily       Lovaza/Ezetimibe-Combination Refill Protocol Passed - 12/31/2020  3:24 PM        Passed - Fasting lipid cascade in last 12 months     Cholesterol   Date Value Ref Range Status   08/14/2020 142 <=199 mg/dL Final     Triglycerides   Date Value Ref Range Status   08/14/2020 135 <=149 mg/dL Final     HDL Cholesterol   Date Value Ref Range Status   08/14/2020 44 (L) >=50 mg/dL Final     LDL Calculated   Date Value Ref Range Status   08/14/2020 71 <=129 mg/dL Final     Patient Fasting > 8hrs?   Date Value Ref Range Status   08/14/2020 Yes  Final             Passed - PCP or prescribing provider visit in past 12 months       Last office visit with prescriber/PCP: 8/14/2020 Beltran Walker MD OR same dept: Visit date not found OR same specialty: 8/14/2020 Beltran Walker MD  Last physical: 10/1/2019 Last MTM visit: Visit date not found   Next visit within 3 mo: Visit date not found  Next physical within 3 mo: Visit date not found  Prescriber OR PCP: Beltran Walker MD  Last diagnosis associated with med order: 1. Hyperlipidemia  - ezetimibe (ZETIA) 10 mg tablet [Pharmacy Med Name: Ezetimibe 10 MG Oral Tablet]; TAKE 1 TABLET BY MOUTH ONCE DAILY  Dispense: 90 tablet; Refill: 0    If protocol passes may refill for 12 months if within 3 months of last provider visit (or a total of 15 months).

## 2021-06-14 NOTE — PROGRESS NOTES
" ASSESSMENT/PLAN:      1. Gastroenteritis  Recent hospitalization with dehydration from acute gastroenteritis, tachycardia.  Has been home, and feels well.  No abdominal pain, or further diarrhea.  May have significant lactose intolerance. Will get electrolytes today.  Follow up with Dr. Walker as previously arranged.   - Basic Metabolic Panel    2. Diabetes mellitus, type 2  No issues, taking her metformin.       Patient Instructions   Continue regular medications. Monitor use of lactose and see if it is causes of abdominal discomfort or diarrhea. Avoid if it does.  Blood test for electrolytes today.     Orders Placed This Encounter   Procedures     Basic Metabolic Panel     There are no discontinued medications.    Return in about 3 years (around 12/22/2020).    CHIEF COMPLAINT:  Chief Complaint   Patient presents with     Hospital Visit Follow Up     [facility] 12/14-12/16 N&V and Dehydration       HISTORY OF PRESENT ILLNESS:  Abril Pinto is a 84 y.o. female her recent gastroenteritis has resolved.  No voiding symptoms, or diarrhea.  May have lactose intolerance, gets bloated with mild, it seems.  No reflux symptoms of dizziness or nausea.  Still doesn't feel like drinking coffee.     TOBACCO USE:  History   Smoking Status     Former Smoker     Years: 10.00     Types: Cigarettes   Smokeless Tobacco     Never Used       VITALS:  Vitals:    12/22/17 0945   BP: 128/56   Patient Site: Left Arm   Patient Position: Sitting   Cuff Size: Adult Regular   Pulse: 74   Resp: 18   SpO2: 96%   Weight: 166 lb (75.3 kg)   Height: 5' 2\" (1.575 m)     Wt Readings from Last 3 Encounters:   12/22/17 166 lb (75.3 kg)   12/16/17 176 lb (79.8 kg)   11/07/17 171 lb 6.4 oz (77.7 kg)       PHYSICAL EXAM:  Constitutional: in NAD, alert and oriented.   Neck:  Supple, thyroid not palpable. No adenopathy  Cardiac:  Regular rate and rhythm without murmur  Lungs: Clear.  Respiratory effort normal.  Abdomen:   Bowel sounds positive, nontender, " nondistended.  No hepatosplenomegaly, no rebound or guarding      MEDICATIONS:  Current Outpatient Prescriptions   Medication Sig Dispense Refill     amLODIPine (NORVASC) 10 MG tablet Take 10 mg by mouth daily.       calcium, as carbonate, (OS-ROLA) 500 mg calcium (1,250 mg) tablet Take 1 tablet by mouth daily.       cholecalciferol, vitamin D3, 1,000 unit tablet Take 1,000 Units by mouth daily.       ciprofloxacin HCl (CIPRO) 500 MG tablet One twice daily for 7 days (UTI) 14 tablet 0     CONTOUR NEXT STRIPS strips Use 1 each As Directed daily. 50 strip 11     ezetimibe (ZETIA) 10 mg tablet Take 10 mg by mouth daily.       generic lancets (MICROLET LANCET) Use 1 each As Directed daily. 100 each 3     levothyroxine (SYNTHROID) 150 MCG tablet Take 1 tablet (150 mcg total) by mouth daily. 90 tablet 3     lisinopril-hydrochlorothiazide (PRINZIDE,ZESTORETIC) 20-25 mg per tablet Take 1 tablet by mouth daily.       magnesium oxide (MAGOX) 400 mg tablet Take 1 tablet (400 mg total) by mouth daily. 200 tablet 1     metFORMIN (GLUCOPHAGE) 500 MG tablet Take 1 tablet (500 mg total) by mouth 2 (two) times a day with meals. 180 tablet 3     metoprolol succinate (TOPROL-XL) 25 MG Take 1 tablet (25 mg total) by mouth daily. 90 tablet 3     omeprazole (PRILOSEC) 20 MG capsule Take 20 mg by mouth daily before breakfast.       potassium gluconate 550 mg (90 mg) tablet Take 90 mg by mouth daily.       pravastatin (PRAVACHOL) 40 MG tablet Take 40 mg by mouth daily.       No current facility-administered medications for this visit.

## 2021-06-17 NOTE — PATIENT INSTRUCTIONS - HE
Patient Instructions by Uyen Aranda CMA at 10/1/2019 10:25 AM     Author: Uyen Aranda CMA Service: -- Author Type: Certified Medical Assistant    Filed: 10/1/2019 11:57 AM Encounter Date: 10/1/2019 Status: Addendum    : Beltran Walker MD (Physician)    Related Notes: Original Note by Uyen Aranda CMA (Certified Medical Assistant) filed at 10/1/2019 10:53 AM         Patient Education   Understanding USDA MyPlate  The USDA (US Department of Agriculture) has guidelines to help you make healthy food choices. These are called MyPlate. MyPlate shows the food groups that make up healthy meals using the image of a place setting. Before you eat, think about the healthiest choices for what to put onto your plate or into your cup or bowl. To learn more about building a healthy plate, visit www.choosemyplate.gov.       The Food Groups    Fruits: Any fruit or 100% fruit juice counts as part of the Fruit Group. Fruits may be fresh, canned, frozen, or dried, and may be whole, cut-up, or pureed. Make half your plate fruits and vegetables.    Vegetables: Any vegetable or 100% vegetable juice counts as a member of the Vegetable Group. Vegetables may be fresh, frozen, canned, or dried. They can be served raw or cooked and may be whole, cut-up, or mashed. Make half your plate fruits and vegetables.     Grains: All foods made from grains are part of the Grains Group. These include wheat, rice, oats, cornmeal, and barley such as bread, pasta, oatmeal, cereal, tortillas, and grits. Grains should be no more than a quarter of your plate. At least half of your grains should be whole grains.    Protein: This group includes meat, poultry, seafood, beans and peas, eggs, processed soy products (like tofu), nuts (including nut butters), and seeds. Make protein choices no more than a quarter of your plate. Meat and poultry choices should be lean or low fat.    Dairy: All fluid milk products and foods made from milk that contain  calcium, like yogurt and cheese are part of the Dairy Group. (Foods that have little calcium, such as cream, butter, and cream cheese, are not part of the group.) Most dairy choices should be low-fat or fat-free.    Oils: These are fats that are liquid at room temperature. They include canola, corn, olive, soybean, and sunflower oil. Foods that are mainly oil include mayonnaise, certain salad dressings, and soft margarines. You should have only 5 to 7 teaspoons of oils a day. You probably already get this much from the food you eat.  Use Zerplyer to Help Build Your Meals  The SuperTracker can help you plan and track your meals and activity. You can look up individual foods to see or compare their nutritional value. You can get guidelines for what and how much you should eat. You can compare your food choices. And you can assess personal physical activities and see ways you can improve. Go to www.Doculynx.gov/Anametrixcker/.    5768-7347 [employer]. [address] [place] [address]. All rights reserved. This information is not intended as a substitute for professional medical care. Always follow your healthcare professional's instructions.           Patient Education   Urinary Incontinence, Female (Adult)  Urinary incontinence means loss of control of the bladder. This problem affects many women, especially as they get older. If you have incontinence, you may be embarrassed to ask for help. But know that this problem can be treated.  Types of Incontinence  There are different types of incontinence. Two of the main types are described here. You can have more than one type.    Stress incontinence. With this type, urine leaks when pressure (stress) is put on the bladder. This may happen when you cough, sneeze, or laugh. Stress incontinence most often occurs because the pelvic floor muscles that support the bladder and urethra are weak. This can happen after pregnancy and vaginal  childbirth or a hysterectomy. It can also be due to excess body weight or hormone changes.    Urge incontinence (also called overactive bladder). With this type, a sudden urge to urinate is felt often. This may happen even though there may not be much urine in the bladder. The need to urinate often during the night is common. Urge incontinence most often occurs because of bladder spasms. This may be due to bladder irritation or infection. Damage to bladder nerves or pelvic muscles, constipation, and certain medicines can also lead to urge incontinence.  Treatment of urinary incontinence depends on the cause. Further evaluation is needed to find the type you have. This will likely include an exam and certain tests. Based on the results, you and your healthcare provider can then plan treatment. Until a diagnosis is made, the home care tips below can help relieve symptoms.  Home care    Do pelvic floor muscle exercises, if they are prescribed. The pelvic floor muscles help support the bladder and urethra. Many women find that their symptoms improve when doing special exercises that strengthen these muscles. To do the exercises contract the muscles you would use to stop your stream of urine, but do this when youre not urinating. Hold for 10 seconds, then relax. Repeat 10 to 20 times in a row, at least 3 times a day. Your provider may give you other instructions for how to do the exercises and how often.    Keep a bladder diary. This helps track how often and how much you urinate over a set period of time. Bring this diary with you to your next visit with the provider. The information can help your provider learn more about your bladder problem.    Lose weight, if advised to by your provider. Excess weight puts pressure on the bladder. Your provider can help you create a weight-loss plan thats right for you. This may include exercising more and making certain diet changes.    Don't consume foods and drinks that may  irritate the bladder. These can include alcohol and caffeinated drinks.    Quit smoking. Smoking and other tobacco use can lead to chronic cough that strains the pelvic floor muscles. Smoking may also damage the bladder and urethra. Talk with your provider about treatments or methods you can use to quit smoking.    If drinking large amounts of fluid causes you to have symptoms, you may be advised to limit your fluid intake. You may also be advised to drink most of your fluids during the day and to limit fluids at night.    If youre worried about urine leakage or accidents, you may wear absorbent pads to catch urine. Change the pads often. This helps reduce discomfort. It may also reduce the risk of skin or bladder infections.  Follow-up care  Follow up with your healthcare provider, or as directed. It may take some to find the right treatment for your problem. Your treatment plan may include special therapies or medicines. Certain procedures or surgery may also be options. Be sure to discuss any questions you have with your provider.  When to seek medical advice  Call the healthcare provider right away if any of these occur:    Fever of 100.4 F (38 C) or higher, or as directed by your provider    Bladder pain or fullness    Abdominal swelling    Nausea or vomiting    Back pain    Weakness, dizziness or fainting  Date Last Reviewed: 10/1/2017    5066-7226 [employer]. [address] [place] [address], [place] [address]. All rights reserved. This information is not intended as a substitute for professional medical care. Always follow your healthcare professional's instructions.       Advance Directive  Patients advance directive was discussed and I am comfortable with the patients wishes.  Patient Education   Personalized Prevention Plan  You are due for the preventive services outlined below.  Your care team is available to assist you in scheduling these services.  If you have already completed any of these  items, please share that information with your care team to update in your medical record.  Health Maintenance   Topic Date Due   ? DIABETES OPHTHALMOLOGY EXAM  10/17/1943   ? PNEUMOCOCCAL IMMUNIZATION 65+ LOW/MEDIUM RISK (2 of 2 - PPSV23) 11/14/2015   ? DIABETES FOLLOW-UP  05/07/2018   ? ZOSTER VACCINES (3 of 3) 07/20/2018   ? DXA SCAN  08/09/2018   ? MEDICARE ANNUAL WELLNESS VISIT  11/07/2018   ? DIABETES FOOT EXAM  11/07/2018   ? FALL RISK ASSESSMENT  11/23/2019   ? DIABETES HEMOGLOBIN A1C  01/01/2020   ? DIABETES URINE MICROALBUMIN  07/01/2020   ? ADVANCE CARE PLANNING  11/07/2022   ? TD 18+ HE  08/17/2026   ? INFLUENZA VACCINE RULE BASED  Completed      1.  Mammogram is due in December    2.  Same medications    3.  I will notify you of test results    4. See in six months for diabetic check

## 2021-06-17 NOTE — PROGRESS NOTES
ASSESSMENT AND PLAN:    1. UTI symptoms  Symptoms of feeling incomplete emptying. Symptoms are not strongly suggestive - no incontinence, or severe urge.   She has had urethral stenosis in the past, and had dilation therapy. UA is negative.  Will get US to ascertain evidence of retention. Abdominal exam is negative.    - Urinalysis-UC if Indicated  - Comprehensive Metabolic Panel  - US Bladder With Pre and Post Void Residual; Future    2. Controlled type 2 diabetes mellitus with complication, without long-term current use of insulin  Symptomatically stable.  Will get lab test., continue present treatment.   - Glycosylated Hemoglobin A1c    3. Benign neoplasm of colon  Last colonoscopy was 2012 confirmed.  Will schedule for follow up given polyp history.   - Ambulatory referral for Colonoscopy    4. Urethral stenosis  Will assess as above, refer to urology if US is positive.     Follow up prn testing results.     CHIEF COMPLAINT:  Chief Complaint   Patient presents with     Urinary Frequency     X 1 WEEK     A1c     HISTORY OF PRESENT ILLNESS:  Abril Pinto is a 84 y.o. female with sense of incomplete emptying.  No dsyuria, or hematuria, or increased nocturia.  No incontinence.  Otherwise feels well.  History of urethral stenosis.  This was treated at [facility] a number of years ago.  The symptoms are mild, no severe urgency.  No rectal bleeding.  No change in bowel movements. d    REVIEW OF SYSTEMS:   See HPI, all other pertinent systems on review are negative.    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      Osteopenia      Chronic Reflux Esophagitis       "Tachycardia 12/14/2017     Urethral stenosis 05/02/2018     Resolved Ambulatory Problems     Diagnosis Date Noted     Urinary frequency      Acute bronchitis      Gastroenteritis      Dehydration      E-coli UTI 12/16/2017     Family history of urethral stenosis 05/02/2018     Past Medical History:   Diagnosis Date     Family history of urethral stenosis 5/2/2018     Urethral stenosis 5/2/2018       Past Surgical History:   Procedure Laterality Date     HERNIA REPAIR       HYSTERECTOMY       [place] JUSTICE W/O FACETEC FORAMOT/DSKC 1/2 VRT SEG, LUMBAR      Description: Laminectomy Decompressive Up To Two Lumbar Segments;  Proc Date: 09/01/2008;  Comments: for spinal stenosis  At L4-L5     MS REVSC OPN/PRQ FEM/POP W/STNT/ANGIOP SM VSL      Description: PTA Femoral-Popliteal Initial Stenosis With Stent;  Proc Date: 03/20/2014;     MS TOTAL ABDOM HYSTERECTOMY      Description: Hysterectomy;  Recorded: 04/02/2008;     MS TOTAL ABDOM HYSTERECTOMY      Description: Hysterectomy;  Recorded: 04/27/2009;     SALPINGOOPHORECTOMY Bilateral      SPINE SURGERY      laminectomy decompressive 2 lumbar segments     stenosis      PTA femoral-popliteal initial with stent     VITALS:  Vitals:    05/02/18 0938   BP: 134/70   Patient Site: Left Arm   Patient Position: Sitting   Cuff Size: Adult Regular   Pulse: 66   SpO2: 96%   Weight: 168 lb 9.6 oz (76.5 kg)   Height: 5' 2\" (1.575 m)     Wt Readings from Last 3 Encounters:   05/02/18 168 lb 9.6 oz (76.5 kg)   12/22/17 166 lb (75.3 kg)   12/16/17 176 lb (79.8 kg)     PHYSICAL EXAM:  Constitutional:  Well appearing in NAD, alert and oriented  Neck:  Supple, no significant adenopathy.  Cardiac:  S1 S2 without murmur, rhythm regular  Lungs: Clear to auscultation, without wheezes or rales  Abdomen:   Soft, flat and non-tender, without hepatosplenomegaly, guarding, rebound, or mass, no bladder enlargement is noted.    : deferred  Rectal:deferred    DECISION TO OBTAIN OLD RECORDS AND/OR OBTAIN " HISTORY FROM SOMEONE OTHER THAN PATIENT, AND/OR ACCESSING CARE EVERYWHERE):  1  Records of 2012 colonoscopy    REVIEW AND SUMMARIZATION OF OLD RECORDS, AND/OR OBTAINING HISTORY FROM SOMEONE OTHER THAN PATIENT, AND/OR DISCUSSION OF CASE WITH ANOTHER HEALTH CARE PROVIDER:  2 0    REVIEW AND/OR ORDER OF OF CLINICAL LAB TESTS: 1  UA.    REVIEW AND/OR ORDER OF RADIOLOGY TESTS: 1 0.    REVIEW AND/OR ORDER OF MEDICAL TESTS (EKG/ECHO/COLONOSCOPY/EGD): 1  0    INDEPENDENT  VISUALIZATION OF IMAGE, TRACING, OR SPECIMEN ITSELF (2 EACH):  Urinalysis interpretation     TOTAL: 3    Current Outpatient Prescriptions   Medication Sig Dispense Refill     amLODIPine (NORVASC) 10 MG tablet Take 10 mg by mouth daily.       calcium, as carbonate, (OS-ROLA) 500 mg calcium (1,250 mg) tablet Take 1 tablet by mouth daily.       cholecalciferol, vitamin D3, 1,000 unit tablet Take 1,000 Units by mouth daily.       ciprofloxacin HCl (CIPRO) 500 MG tablet One twice daily for 7 days (UTI) 14 tablet 0     CONTOUR NEXT STRIPS strips Use 1 each As Directed daily. 50 strip 11     ezetimibe (ZETIA) 10 mg tablet Take 10 mg by mouth daily.       generic lancets (MICROLET LANCET) Use 1 each As Directed daily. 100 each 3     levothyroxine (SYNTHROID) 150 MCG tablet Take 1 tablet (150 mcg total) by mouth daily. 90 tablet 3     lisinopril-hydrochlorothiazide (PRINZIDE,ZESTORETIC) 20-25 mg per tablet Take 1 tablet by mouth daily.       magnesium oxide (MAGOX) 400 mg tablet Take 1 tablet (400 mg total) by mouth daily. 200 tablet 1     metFORMIN (GLUCOPHAGE) 500 MG tablet Take 1 tablet (500 mg total) by mouth 2 (two) times a day with meals. 180 tablet 3     metoprolol succinate (TOPROL-XL) 25 MG Take 1 tablet (25 mg total) by mouth daily. 90 tablet 3     omeprazole (PRILOSEC) 20 MG capsule Take 20 mg by mouth daily before breakfast.       potassium gluconate 550 mg (90 mg) tablet Take 90 mg by mouth daily.       pravastatin (PRAVACHOL) 40 MG tablet Take 40 mg by  mouth daily.       No current facility-administered medications for this visit.      Aayush Velásquez MD  Internal Medicine  [facility]

## 2021-06-18 NOTE — ANESTHESIA CARE TRANSFER NOTE
Last vitals:   Vitals:    06/06/18 1438   BP: 112/56   Pulse:    Resp:    Temp: 36.4  C (97.5  F)   SpO2:      Patient's level of consciousness is awake  Spontaneous respirations: yes  Maintains airway independently: yes  Dentition unchanged: yes  Oropharynx: oropharynx clear of all foreign objects    QCDR Measures:  ASA# 20 - Surgical Safety Checklist: WHO surgical safety checklist completed prior to induction  PQRS# 430 - Adult PONV Prevention: 4558F - Pt received => 2 anti-emetic agents (different classes) preop & intraop  ASA# 8 - Peds PONV Prevention: NA - Not pediatric patient, not GA or 2 or more risk factors NOT present  PQRS# 424 - Mulu-op Temp Management: 4559F - At least one body temp DOCUMENTED => 35.5C or 95.9F within required timeframe  PQRS# 426 - PACU Transfer Protocol: - Transfer of care checklist used  ASA# 14 - Acute Post-op Pain: ASA14B - Patient did NOT experience pain >= 7 out of 10   Patient transferred to bed. Taken to SADAF Phase II on O2 at 10L via mask.  In Phase II vital signs stable, SBAR report to RN per institutional policy and procedure  Transfer of care.

## 2021-06-18 NOTE — ANESTHESIA PREPROCEDURE EVALUATION
Anesthesia Evaluation      Patient summary reviewed     Airway   Mallampati: II   Pulmonary - negative ROS and normal exam                          Cardiovascular - normal exam  (+) hypertension, , PVD    Rhythm: regular  Rate: normal,      ROS comment: Carotid artery stenosis     Neuro/Psych - negative ROS     Endo/Other    (+) diabetes mellitus type 2, hypothyroidism,      GI/Hepatic/Renal    (+) GERD,             Dental - normal exam                        Anesthesia Plan  Planned anesthetic: MAC    ASA 3     Anesthetic plan and risks discussed with: patient    Post-op plan: routine recovery

## 2021-06-18 NOTE — ANESTHESIA POSTPROCEDURE EVALUATION
Patient: Abril Pinto  COLONOSCOPY WITH POLYPECTOMY  Anesthesia type: MAC    Patient location: Phase II Recovery  Last vitals:   Vitals:    06/06/18 1445   BP:    Pulse: 65   Resp: 16   Temp:    SpO2: 96%     Post vital signs: stable  Level of consciousness: awake and responds to simple questions  Post-anesthesia pain: pain controlled  Post-anesthesia nausea and vomiting: no  Pulmonary: unassisted, nasal cannula  Cardiovascular: stable and blood pressure at baseline  Hydration: adequate  Anesthetic events: no    QCDR Measures:  ASA# 11 - Mulu-op Cardiac Arrest: ASA11B - Patient did NOT experience unanticipated cardiac arrest  ASA# 12 - Mulu-op Mortality Rate: ASA12B - Patient did NOT die  ASA# 13 - PACU Re-Intubation Rate: NA - No ETT / LMA used for case  ASA# 10 - Composite Anes Safety: ASA10A - No serious adverse event    Additional Notes:

## 2021-06-18 NOTE — PROGRESS NOTES
Preoperative Exam    Scheduled Procedure: Colonoscopy  Surgery Date:  6/6/18  Surgery Location: [facility], fax [phone]    Surgeon:  Dr Leiva    Assessment/Plan:     1. Peripheral vascular disease  S/p stenting left femoral artery, now asymptomatic.     2. Colon polyps  History     3. Hyperlipidemia  Treated     4. Diabetes type 2, controlled  Controlled with diet and metformin.  Does not need to hold metformin prior to the procedure.     5. Hypertension  Controlled, will get electrolytes   - Basic Metabolic Panel    6. Preoperative examination  She is medically stable, I find no contraindication to having the colonoscopy and recommend proceeding as planned.   - HM2(CBC w/o Differential)     Surgical Procedure Risk: Low (reported cardiac risk generally < 1%)  Have you had prior anesthesia?: Yes  Have you or any family members had a previous anesthesia reaction:  No  Do you or any family members have a history of a clotting or bleeding disorder?: No  Cardiac Risk Assessment: no increased risk for major cardiac complications    Patient approved for surgery with general or local anesthesia.    Functional Status: Independent  Patient plans to recover at home with family.     Subjective:      Abril Pinto is a 84 y.o. female who presents for a preoperative consultation.  Is go have colonoscopy and wants preoperative clearance.  She has been well. No chest pain or unusual dyspnea.  Voiding has been OK.  No fever, or unusual cough. No history of heart disease.  Diabetes is controlled with diet and metformin.      All other systems reviewed and are negative, other than those listed in the HPI.    Pertinent History  Do you have difficulty breathing or chest pain after walking up a flight of stairs: No  History of obstructive sleep apnea: No  Steroid use in the last 6 months: No  Frequent Aspirin/NSAID use: Yes: Aspirin 81mg daily , Aleve PRN  Prior Blood Transfusion: No  Prior Blood Transfusion Reaction: No  If  for some reason prior to, during or after the procedure, if it is medically indicated, would you be willing to have a blood transfusion?:  There is no transfusion refusal.    Current Outpatient Prescriptions   Medication Sig Dispense Refill     amLODIPine (NORVASC) 10 MG tablet Take 10 mg by mouth daily.       calcium, as carbonate, (OS-ROLA) 500 mg calcium (1,250 mg) tablet Take 1 tablet by mouth daily.       cholecalciferol, vitamin D3, 1,000 unit tablet Take 1,000 Units by mouth daily.       ciprofloxacin HCl (CIPRO) 500 MG tablet One twice daily for 7 days (UTI) 14 tablet 0     CONTOUR NEXT STRIPS strips Use 1 each As Directed daily. 50 strip 11     ezetimibe (ZETIA) 10 mg tablet Take 10 mg by mouth daily.       generic lancets (MICROLET LANCET) Use 1 each As Directed daily. 100 each 3     levothyroxine (SYNTHROID) 150 MCG tablet Take 1 tablet (150 mcg total) by mouth daily. 90 tablet 3     lisinopril-hydrochlorothiazide (PRINZIDE,ZESTORETIC) 20-25 mg per tablet Take 1 tablet by mouth daily.       magnesium oxide (MAGOX) 400 mg tablet Take 1 tablet (400 mg total) by mouth daily. 200 tablet 1     metFORMIN (GLUCOPHAGE) 500 MG tablet Take 1 tablet (500 mg total) by mouth 2 (two) times a day with meals. 180 tablet 1     metoprolol succinate (TOPROL-XL) 25 MG Take 1 tablet (25 mg total) by mouth daily. 90 tablet 3     omeprazole (PRILOSEC) 20 MG capsule Take 20 mg by mouth daily before breakfast.       potassium gluconate 550 mg (90 mg) tablet Take 90 mg by mouth daily.       pravastatin (PRAVACHOL) 40 MG tablet Take 40 mg by mouth daily.       No current facility-administered medications for this visit.       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     Lichen Planus     Hypothyroidism     Diabetes type 2, controlled     Hyperlipidemia     Thrombocytopenia     Hypertension     Carotid Artery Stenosis     Benign  Tubular Adenoma Of The Large Intestine     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     Osteopenia     Chronic Reflux Esophagitis     Tachycardia     Urethral stenosis     Past Medical History:   Diagnosis Date     Family history of urethral stenosis 5/2/2018     Urethral stenosis 5/2/2018     Past Surgical History:   Procedure Laterality Date     HERNIA REPAIR       HYSTERECTOMY       [place] JUSTICE W/O FACETEC FORAMOT/DSKC 1/2 VRT SEG, LUMBAR      Description: Laminectomy Decompressive Up To Two Lumbar Segments;  Proc Date: 09/01/2008;  Comments: for spinal stenosis  At L4-L5     [place] REVSC OPN/PRQ FEM/POP W/STNT/ANGIOP SM VSL      Description: PTA Femoral-Popliteal Initial Stenosis With Stent;  Proc Date: 03/20/2014;     [place] TOTAL ABDOM HYSTERECTOMY      Description: Hysterectomy;  Recorded: 04/02/2008;     [place] TOTAL ABDOM HYSTERECTOMY      Description: Hysterectomy;  Recorded: 04/27/2009;     SALPINGOOPHORECTOMY Bilateral      SPINE SURGERY      laminectomy decompressive 2 lumbar segments     stenosis      PTA femoral-popliteal initial with stent     Social History     Social History     Marital status:      Spouse name: N/A     Number of children: N/A     Years of education: N/A     Occupational History     Not on file.     Social History Main Topics     Smoking status: Former Smoker     Years: 10.00     Types: Cigarettes     Smokeless tobacco: Never Used     Alcohol use Yes     Drug use: No     Sexual activity: Not on file     Other Topics Concern     Not on file     Social History Narrative     Patient Care Team:  Beltran Walker MD as PCP - General  Nichole Hoffman MD (Dermatology)      Objective:       Physical Exam:  She is well appearing in NAD.  HEENT is negative, Neck is supple without adenopathy, Chest is clear to auscultation, Heart - S1 S2 without murmur, Abdomen is soft, flat and non-tender, Extremities - diminished pulses, minor edema, no inflammation, Neuro - no focal  weakness or numbness,     EKG:  NSR no significant ST or T changes.     Labs:  BMP and CBC are pending.     Electronically signed by Aayush Velásquez MD 06/04/18 3:18 PM

## 2021-06-19 NOTE — LETTER
Letter by Beltran Walker MD at      Author: Beltran Walker MD Service: -- Author Type: --    Filed:  Encounter Date: 7/3/2019 Status: (Other)         Abril Pinto  [address]  [place] [address]             July 3, 2019         Dear Ms. Blair,    Below are the results from your recent visit:    Resulted Orders   Glycosylated Hemoglobin A1c   Result Value Ref Range    Hemoglobin A1c 6.4 (H) 3.5 - 6.0 %   Microalbumin, Random Urine   Result Value Ref Range    Microalbumin, Random Urine 0.59 0.00 - 1.99 mg/dL    Creatinine, Urine 30.5 mg/dL    Microalbumin/Creatinine Ratio Random Urine 19.3 <=19.9 mg/g    Narrative    Microalbumin, Random Urine  <2.0 mg/dL . . . . . . . . Normal  3.0-30.0 mg/dL . . . . . . Microalbuminuria  >30.0 mg/dL . . . . . .  . Clinical Proteinuria    Microalbumin/Creatinine Ratio, Random Urine  <20 mg/g . . . . .. . . . Normal   mg/g . . . . . . . Microalbuminuria  >300 mg/g . . . . . . . . Clinical Proteinuria       Basic Metabolic Panel   Result Value Ref Range    Sodium 138 136 - 145 mmol/L    Potassium 3.8 3.5 - 5.0 mmol/L    Chloride 102 98 - 107 mmol/L    CO2 28 22 - 31 mmol/L    Anion Gap, Calculation 8 5 - 18 mmol/L    Glucose 140 (H) 70 - 125 mg/dL    Calcium 10.0 8.5 - 10.5 mg/dL    BUN 14 8 - 28 mg/dL    Creatinine 0.74 0.60 - 1.10 mg/dL    GFR MDRD Af Amer >60 >60 mL/min/1.73m2    GFR MDRD Non Af Amer >60 >60 mL/min/1.73m2    Narrative    Fasting Glucose reference range is 70-99 mg/dL per  American Diabetes Association (ADA) guidelines.   Thyroid Stimulating Hormone (TSH)   Result Value Ref Range    TSH 0.61 0.30 - 5.00 uIU/mL       Abril: Diabetic control remains good with a blood sugar of 140 and a hemoglobin A1c of 6.4.  The TSH, (thyroid-stimulating hormone), is in the normal range at 0.61.  Continue your current levothyroxine dose.  Other labs including your potassium and tests of kidney function are normal.  It was nice to see you.  I will notify you of the  ultrasound results when available.    Please call with questions or contact us using [employer].    Sincerely,        Electronically signed by Beltran Walker MD

## 2021-06-19 NOTE — LETTER
Letter by Beltran Walker MD at      Author: Beltran Walker MD Service: -- Author Type: --    Filed:  Encounter Date: 7/10/2019 Status: (Other)         Abril Pinto  [address]   [place] [address]             July 10, 2019         Dear Ms. Blair,    Below are the results from your recent visit:    Resulted Orders   US Ankle Brachial Indices Pre and Post Exercise    Narrative    EXAM: RESTING AND POSTEXERCISE ANKLE-BRACHIAL INDICES (ABIs)  LOCATION: [facility]  DATE/TIME: 7/8/2019 1:21 PM    INDICATION: Leg pain at rest and with activity.  COMPARISON: None.    FINDINGS:   RIGHT                                                 Brachial: 146  Ankle (PT): 126 Index: 0.86    Ankle (DP): 111 Index: 0.76   Digit: 94 Index: 0.64       LEFT  Brachial: 147  Ankle (PT): 122 Index: 0.83  Ankle (DP): 122 Index: 0.83  Digit: 62 Index: 0.42    EXERCISE PRESSURES (mmHg)  RIGHT ANKLE PT  Rest: 126  Post: 134    LEFT ANKLE PT  Rest: 122  Post: 128    LEFT BRACHIAL  Rest: 147  Post: 153    RIGHT ELIZABETH  Rest: 0.86  Post: 0.88    LEFT ELIZABETH  Rest: 0.83  Post: 0.84    The patient was exercised on a treadmill at 1.2 mph at a 5% incline for 5 minutes total. She described left calf tightness at 1 minute 45 seconds and bilateral upper thigh discomfort at 2 minutes.    Resting and immediate postexercise ABIs are 0.86 and 0.88 on the right.    Resting and immediate postexercise ABIs are 0.83 and 0.84 on the left.    WAVEFORMS: The VPR waveforms are unremarkable.      Impression    1.  RIGHT LOWER EXTREMITY: ELIZABETH at rest is mildly decreased with a borderline abnormal response to exercise. These findings would  be consistent with symptoms of mild arterial claudication.  2.  LEFT LOWER EXTREMITY: ELIZABETH at rest is decreased with  abnormal response to exercise. These findings would  be consistent with symptoms of mild to moderate arterial claudication.       Abril: The ultrasound is consistent with mild to moderate arterial insufficiency.   It does not seem severe enough to warrant surgical intervention.  A regular walking program would be of benefit.  We can discuss medications for this at your next appointment.    Please call with questions or contact us using [employer].    Sincerely,        Electronically signed by Beltran Walker MD

## 2021-06-19 NOTE — LETTER
Letter by Beltran Walker MD at      Author: Beltran Walker MD Service: -- Author Type: --    Filed:  Encounter Date: 10/2/2019 Status: Signed         Abril Pinto  [address]  [place] [address]             October 2, 2019         Dear Ms. Blair,    Below are the results from your recent visit:    Resulted Orders   Comprehensive Metabolic Panel   Result Value Ref Range    Sodium 134 (L) 136 - 145 mmol/L    Potassium 4.2 3.5 - 5.0 mmol/L    Chloride 98 98 - 107 mmol/L    CO2 28 22 - 31 mmol/L    Anion Gap, Calculation 8 5 - 18 mmol/L    Glucose 114 70 - 125 mg/dL    BUN 12 8 - 28 mg/dL    Creatinine 0.70 0.60 - 1.10 mg/dL    GFR MDRD Af Amer >60 >60 mL/min/1.73m2    GFR MDRD Non Af Amer >60 >60 mL/min/1.73m2    Bilirubin, Total 0.4 0.0 - 1.0 mg/dL    Calcium 9.9 8.5 - 10.5 mg/dL    Protein, Total 6.9 6.0 - 8.0 g/dL    Albumin 4.1 3.5 - 5.0 g/dL    Alkaline Phosphatase 79 45 - 120 U/L    AST 17 0 - 40 U/L    ALT 20 0 - 45 U/L    Narrative    Fasting Glucose reference range is 70-99 mg/dL per  American Diabetes Association (ADA) guidelines.   Urinalysis-UC if Indicated   Result Value Ref Range    Color, UA Yellow Colorless, Yellow, Straw, Light Yellow    Clarity, UA Clear Clear    Glucose, UA Negative Negative    Bilirubin, UA Negative Negative    Ketones, UA Negative Negative    Specific Gravity, UA 1.020 1.005 - 1.030    Blood, UA Trace (!) Negative    pH, UA 7.5 5.0 - 8.0    Protein, UA Negative Negative mg/dL    Urobilinogen, UA 1.0 E.U./dL 0.2 E.U./dL, 1.0 E.U./dL    Nitrite, UA Negative Negative    Leukocytes, UA Small (!) Negative    Bacteria, UA None Seen None Seen hpf    RBC, UA 3-5 (!) None Seen, 0-2 hpf    WBC, UA 0-5 None Seen, 0-5 hpf    Squam Epithel, UA 0-5 None Seen, 0-5 lpf    Narrative    Urine Culture ordered based on [facility] criteria   HM2(CBC w/o Differential)   Result Value Ref Range    WBC 6.0 4.0 - 11.0 thou/uL    RBC 4.99 3.80 - 5.40 mill/uL    Hemoglobin 14.1 12.0  - 16.0 g/dL    Hematocrit 42.9 35.0 - 47.0 %    MCV 86 80 - 100 fL    MCH 28.3 27.0 - 34.0 pg    MCHC 32.9 32.0 - 36.0 g/dL    RDW 11.6 11.0 - 14.5 %    Platelets 246 140 - 440 thou/uL    MPV 7.3 7.0 - 10.0 fL   Lipid Cascade   Result Value Ref Range    Cholesterol 131 <=199 mg/dL    Triglycerides 125 <=149 mg/dL    HDL Cholesterol 46 (L) >=50 mg/dL    LDL Calculated 60 <=129 mg/dL    Patient Fasting > 8hrs? Yes    Glycosylated Hemoglobin A1c   Result Value Ref Range    Hemoglobin A1c 6.5 (H) 3.5 - 6.0 %   Microalbumin, Random Urine   Result Value Ref Range    Microalbumin, Random Urine 0.64 0.00 - 1.99 mg/dL    Creatinine, Urine 66.9 mg/dL    Microalbumin/Creatinine Ratio Random Urine 9.6 <=19.9 mg/g    Narrative    Microalbumin, Random Urine  <2.0 mg/dL . . . . . . . . Normal  3.0-30.0 mg/dL . . . . . . Microalbuminuria  >30.0 mg/dL . . . . . .  . Clinical Proteinuria    Microalbumin/Creatinine Ratio, Random Urine  <20 mg/g . . . . .. . . . Normal   mg/g . . . . . . . Microalbuminuria  >300 mg/g . . . . . . . . Clinical Proteinuria       Thyroid Stimulating Hormone (TSH)   Result Value Ref Range    TSH 0.36 0.30 - 5.00 uIU/mL   Culture, Urine   Result Value Ref Range    Culture No Growth        Abril: Diabetic control is great with a blood sugar 114 and a hemoglobin A1c of 6.5.  The TSH, (thyroid-stimulating hormone), is in the desired range at 0.36.  Continue the current levothyroxine dose.  Lipids are excellent with a total cholesterol of 131 and an LDL of 60.  Other labs including your potassium, hemoglobin, liver and kidney tests are normal.  It was nice to see you.  We should recheck diabetic tests in 6 months.    Please call with questions or contact us using [employer].    Sincerely,        Electronically signed by Beltran Walker MD

## 2021-06-20 NOTE — LETTER
Letter by Beltran Walker MD at      Author: Beltran Walker MD Service: -- Author Type: --    Filed:  Encounter Date: 8/17/2020 Status: (Other)         Abril Pinto  [address]  [place] [address]             August 17, 2020         Dear Ms. Statonaf,    Below are the results from your recent visit:    Resulted Orders   Glycosylated Hemoglobin A1c   Result Value Ref Range    Hemoglobin A1c 6.3 (H) <=5.6 %      Comment:      Normal <5.7% Prediabete 5.7-6.4% Diabletes 6.5% or higher - adopted from ADA consensus guidelines   Basic Metabolic Panel   Result Value Ref Range    Sodium 138 136 - 145 mmol/L    Potassium 4.4 3.5 - 5.0 mmol/L    Chloride 101 98 - 107 mmol/L    CO2 28 22 - 31 mmol/L    Anion Gap, Calculation 9 5 - 18 mmol/L    Glucose 108 70 - 125 mg/dL    Calcium 10.0 8.5 - 10.5 mg/dL    BUN 11 8 - 28 mg/dL    Creatinine 0.70 0.60 - 1.10 mg/dL    GFR MDRD Af Amer >60 >60 mL/min/1.73m2    GFR MDRD Non Af Amer >60 >60 mL/min/1.73m2    Narrative    Fasting Glucose reference range is 70-99 mg/dL per  American Diabetes Association (ADA) guidelines.   Lipid Cascade   Result Value Ref Range    Cholesterol 142 <=199 mg/dL    Triglycerides 135 <=149 mg/dL    HDL Cholesterol 44 (L) >=50 mg/dL    LDL Calculated 71 <=129 mg/dL    Patient Fasting > 8hrs? Yes    HM2(CBC w/o Differential)   Result Value Ref Range    WBC 5.5 4.0 - 11.0 thou/uL    RBC 5.06 3.80 - 5.40 mill/uL    Hemoglobin 14.2 12.0 - 16.0 g/dL    Hematocrit 43.2 35.0 - 47.0 %    MCV 85 80 - 100 fL    MCH 28.0 27.0 - 34.0 pg    MCHC 32.8 32.0 - 36.0 g/dL    RDW 12.7 11.0 - 14.5 %    Platelets 227 140 - 440 thou/uL    MPV 7.8 7.0 - 10.0 fL       Abril Mario: Diabetic control is great with a blood sugar of 108 and a hemoglobin A1c of 6.3.  Continue the current dose of metformin.  Lipids also are good with a total cholesterol of 142 and an LDL fraction of 71.  Continue the pravastatin and Zetia.  Other labs including your hemoglobin, potassium, and tests of  kidney function are normal.  It was nice to see you.    Please call with questions or contact us using [employer].    Sincerely,        Electronically signed by Beltran Walker MD

## 2021-06-21 NOTE — PROGRESS NOTES
ASSESSMENT:  1. DM2 (diabetes mellitus, type 2) (H)  Abril does not check blood sugars with any regularity.  She is on metformin 500 mg twice daily.  She has not had diarrhea or other adverse effects.  Hemoglobin A1c and monitoring labs will be checked  - Glycosylated Hemoglobin A1c  - Comprehensive Metabolic Panel  - Lipid Cascade  - Microalbumin, Random Urine    2.  Hypothyroidism on replacement:  She remains on levothyroxine 150 mcg daily.  She is clinically euthyroid    3. Mixed hyperlipidemia  Treated with Pravachol and Zetia.  She did have problems with muscle aches on simvastatin.  Fasting lipid cascade will be checked  - Lipid Cascade    4. Encounter for medication monitoring  Monitoring labs will be checked  - Comprehensive Metabolic Panel  - HM2(CBC w/o Differential); Future  - HM2(CBC w/o Differential)    5. Essential hypertension, benign  On amlodipine 10 mg daily, lisinopril-HCTZ 20/25 daily, and metoprolol XL 25 mg daily.  Blood pressure control is good on current regimen.  She denies adverse effects from medications  - Comprehensive Metabolic Panel  - HM2(CBC w/o Differential); Future  - HM2(CBC w/o Differential)    6. Visit for screening mammogram  Last mammogram was in 2016.  The pros and cons of continuing mammograms at age 85 was discussed.  She will schedule a follow-up mammogram  - Mammo Screening Bilateral; Future    7. Lumbar radiculopathy  She notes low back pain with radicular symptoms into the left leg.  This has been present for several months.  He did have radicular low back pain in 2008 which improved with a facet joint injection and conservative care.  An MRI will be ordered for further evaluation  - MR Lumbar Spine Without Contrast; Future    8.  Past history of tobacco use  Remains off of cigarettes    PLAN:  Patient Instructions   1.  I will notify you of labs    2.  Schedule MRI of lumbar spine.  She may require a spine clinic consultation based on results.    3.  Screening  mammogram    4.  Diabetes follow-up in six months.  Earlier if hemoglobin A1c is not at goal      Orders Placed This Encounter   Procedures     Mammo Screening Bilateral     Standing Status:   Future     Standing Expiration Date:   2/23/2020     Order Specific Question:   Can the procedure be changed per Radiologist protocol?     Answer:   Yes     MR Lumbar Spine Without Contrast     Standing Status:   Future     Standing Expiration Date:   11/23/2019     Order Specific Question:   Can the procedure be changed per Radiologist protocol?     Answer:   Yes     Order Specific Question:   If this is a diagnostic procedure, have the patient's age and recent imaging history been considered?     Answer:   Yes     Order Specific Question:   Is the patient claustrophobic and in need of sedation to complete their MR scan?     Answer:   No     Glycosylated Hemoglobin A1c     Comprehensive Metabolic Panel     Lipid Cascade     Order Specific Question:   Fasting is required?     Answer:   Unknown     HM2(CBC w/o Differential)     Standing Status:   Future     Number of Occurrences:   1     Standing Expiration Date:   11/23/2019     Microalbumin, Random Urine     There are no discontinued medications.    No Follow-up on file.      ASSESSED PROBLEMS:  1. DM2 (diabetes mellitus, type 2) (H)  Glycosylated Hemoglobin A1c    Comprehensive Metabolic Panel    Lipid Cascade    Microalbumin, Random Urine   2. Type 2 diabetes mellitus without complication, without long-term current use of insulin (H)     3. Mixed hyperlipidemia  Lipid Cascade   4. Encounter for medication monitoring  Comprehensive Metabolic Panel    HM2(CBC w/o Differential)    HM2(CBC w/o Differential)   5. Essential hypertension, benign  Comprehensive Metabolic Panel    HM2(CBC w/o Differential)    HM2(CBC w/o Differential)   6. Visit for screening mammogram  Mammo Screening Bilateral   7. Lumbar radiculopathy  MR Lumbar Spine Without Contrast       CHIEF  "COMPLAINT:  Chief Complaint   Patient presents with     Back Pain     lower back       HISTORY OF PRESENT ILLNESS:  Abril Mario is a 85 y.o. female presenting to the clinic today for diabetes management with complaints of back pain.     Back pain: She gets back pain that is bad enough at times to stop her in her tracks. She also gets numbness in her legs, more in the left one than the right. It has been going on for a long while, she keeps thinking it will get better and then it does not. Sometimes sitting on a stool can make it worse. Otherwise it just happens and she does not know what is associated with it. The numbness in her leg leg goes all the way down to her foot and can make her foot feel \"frozen\" for a little bit.     shortness of breath: She gets some shortness of breath with exertion, but it is not enough to bother her.     DMII: Last A1C 6.8 on 5/2/18. She is taking her medications and tolerating them well.     HTN: This is well controlled. She checks her blood pressures at home and they are usually systolic 110 or so.     Bladder: She goes pretty often but she is not worried about it and states she is not training herself to hold it.     Health maintenance: She got her flu shot already this fall. Mammogram up to date.     REVIEW OF SYSTEMS:   Denies weakness, chest pain, medication side effects.   Admits to polyuria.   All other systems are negative.    PFSH:  Last eye exam last February.   Reviewed as below    TOBACCO USE:  Social History     Tobacco Use   Smoking Status Former Smoker     Years: 10.00     Types: Cigarettes   Smokeless Tobacco Never Used       VITALS:  Vitals:    11/23/18 1407   BP: 106/64   Patient Site: Left Arm   Patient Position: Sitting   Cuff Size: Adult Regular   Pulse: 70   SpO2: 96%   Weight: 169 lb 1 oz (76.7 kg)   Height: 5' 2\" (1.575 m)     Wt Readings from Last 3 Encounters:   11/23/18 169 lb 1 oz (76.7 kg)   06/06/18 169 lb 5 oz (76.8 kg)   06/04/18 172 lb 3 oz (78.1 kg) "     Body mass index is 30.92 kg/m .    PHYSICAL EXAM:  Constitutional:   Reveals an alert pleasant talkative elderly woman in no acute distress.  Vitals: per nursing notes.  HEENT: atraumatic Ears:  External canals, TMs clear.    Eyes: No conjunctival hyperemia, EOMs full, PERRL.  Oropharynx:   Mouth and throat clear, no thrush or exudate.  Neck:  Supple, no carotid bruits or adenopathy.  Back:  No spine or CVA pain.  Thorax:  No bony deformities.  Lungs: Clear to A&P without rales or wheezes.  Respiratory effort normal.  Cardiac:   Regular rate and rhythm, normal S1, S2, no murmur or gallop.  Breasts:   No masses, no axillary adenopathy.  Abdomen:  Soft, active bowel sounds without bruits, mass, or tenderness, femoral pulses intact.   : not done  Extremities: Pulses in feet diminished.  No peripheral edema. Negative straight leg raise bilaterally. Sensation of numbness reported on dorsal aspect of left foot.     Skin:  No jaundice, peripheral cyanosis or lesions to suggest malignancy.  Neuro:  Alert and oriented. No gross focal deficits.DTRs symmetric in knees, not elicited in ankles.   Psychiatric:  Memory intact, mood appropriate.    ADDITIONAL HISTORY SUMMARIZED (2): None.  DECISION TO OBTAIN EXTRA INFORMATION (1): None.   RADIOLOGY TESTS (1): Mammogram 7/11/16 reviewed for clearance, mammogram ordered today.   LABS (1): Reviewed 6/6/18, 6/4/18, 5/2/18 labs and ordered labs today.   MEDICINE TESTS (1): 2/20/12 colonoscopy reviewed for clearance.   INDEPENDENT REVIEW (2 each): None.     The visit lasted a total of 22 minutes face to face with the patient. Over 50% of the time was spent counseling and educating the patient about diabetes and preventive care.     I, Bernard Cheema, am scribing for and in the presence of, Dr. Walker.    I, Beltran Walker, personally performed the services described in this documentation, as scribed by Bernard Cheema in my presence, and it is both accurate and complete.    Dragon  dictation was used for this note.  Speech recognition errors are a possibility.    MEDICATIONS:  Current Outpatient Medications   Medication Sig Dispense Refill     amLODIPine (NORVASC) 10 MG tablet Take 10 mg by mouth daily.       amLODIPine (NORVASC) 10 MG tablet TAKE ONE TABLET BY MOUTH ONCE DAILY 90 tablet 3     azithromycin (ZITHROMAX Z-DAO) 250 MG tablet Take 2 tablets (500 mg) on  Day 1,  followed by 1 tablet (250 mg) once daily on Days 2 through 5. 6 tablet 0     calcium, as carbonate, (OS-ROLA) 500 mg calcium (1,250 mg) tablet Take 1 tablet by mouth daily.       cholecalciferol, vitamin D3, 1,000 unit tablet Take 1,000 Units by mouth daily.       CONTOUR NEXT STRIPS strips Use 1 each As Directed daily. 50 strip 11     ezetimibe (ZETIA) 10 mg tablet Take 10 mg by mouth daily.       ezetimibe (ZETIA) 10 mg tablet TAKE ONE TABLET BY MOUTH ONCE DAILY 90 tablet 3     generic lancets (MICROLET LANCET) Use 1 each As Directed daily. 100 each 3     levothyroxine (SYNTHROID) 150 MCG tablet Take 1 tablet (150 mcg total) by mouth daily. 90 tablet 3     lisinopril-hydrochlorothiazide (PRINZIDE,ZESTORETIC) 20-25 mg per tablet Take 1 tablet by mouth daily.       lisinopril-hydrochlorothiazide (PRINZIDE,ZESTORETIC) 20-25 mg per tablet TAKE ONE TABLET BY MOUTH ONCE DAILY 90 tablet 3     metFORMIN (GLUCOPHAGE) 500 MG tablet Take 1 tablet (500 mg total) by mouth 2 (two) times a day with meals. 180 tablet 1     metoprolol succinate (TOPROL-XL) 25 MG TAKE ONE TABLET BY MOUTH ONCE DAILY 90 tablet 1     omeprazole (PRILOSEC) 20 MG capsule Take 20 mg by mouth daily before breakfast.       omeprazole (PRILOSEC) 20 MG capsule TAKE ONE CAPSULE BY MOUTH ONCE DAILY 90 capsule 3     potassium gluconate 550 mg (90 mg) tablet Take 90 mg by mouth daily.       pravastatin (PRAVACHOL) 40 MG tablet Take 40 mg by mouth daily.       pravastatin (PRAVACHOL) 40 MG tablet TAKE ONE TABLET BY MOUTH ONCE DAILY 90 tablet 3     No current  facility-administered medications for this visit.        Total data points: 3

## 2021-06-22 NOTE — PROGRESS NOTES
ASSESSMENT: Abril Pinto is a 85 y.o. female who presents for consultation at the request of [facility] PCP Beltran Walker MD, with a past medical history significant for hypothyroidism, type 2 diabetes, hyperlipidemia, hypertension, carotid artery stenosis, osteopenia, peripheral vascular disease, chronic reflux esophagitis, urethral stenosis, cystadenoma of the ovary who presents today for new patient evaluation of:    -Chronic  bilateral low back pain with standing and walking that resolves with sitting most consistent with neurogenic claudication however the symptom is tolerable.    -Acute severe left low back pain radiating to left buttock with numbness and tingling in an L4 and L5 dermatomal pattern that is significant at times    Patient is neurologically intact on exam. No myelopathic or red flag symptoms.     ITA Score: 14    WHO 5: 20     PHQ-2: 0    Diagnoses and all orders for this visit:    Left lumbar radiculopathy  -     Ambulatory referral to PT/OT  -     OPS TFESI Lumbar Sacral Unilateral; Future; Expected date: 12/19/2018    Lumbar foraminal stenosis  -     Ambulatory referral to PT/OT  -     OPS TFESI Lumbar Sacral Unilateral; Future; Expected date: 12/19/2018    Spinal stenosis of lumbar region with neurogenic claudication  -     Ambulatory referral to PT/OT    Left lumbar radiculitis  -     Ambulatory referral to PT/OT  -     OPS TFESI Lumbar Sacral Unilateral; Future; Expected date: 12/19/2018    Acute left-sided low back pain with left-sided sciatica  -     Ambulatory referral to PT/OT  -     OPS TFESI Lumbar Sacral Unilateral; Future; Expected date: 12/19/2018      PLAN:  Reviewed spine anatomy and disease process. Discussed diagnosis and treatment options with the patient today. A shared decision making model was used.  The patient's values and choices were respected. The following represents what was discussed and decided upon by the provider and the patient.      -DIAGNOSTIC TESTS:  Images  were personally reviewed and interpreted and explained to patient today using spine model.   --Lumbar CT scan 12/3/2018 shows severe L3-4 trefoil spinal stenosis with mild to moderate right greater than left foraminal stenosis.  L4-5 mild to moderate peripheral spinal stenosis with moderate to severe bilateral foraminal stenosis.  L5-S1 mild spinal stenosis with mild bilateral foraminal stenosis.  Mild spinal stenosis L2-3.  Moderate to advanced multilevel facet arthropathy.  **Patient cannot have lumbar MRI due to abdominal stent.  She cannot have an MRI of the brain or cervical spine however.    -PHYSICAL THERAPY: Referral to physical therapy [facility] and [facility] motta today to establish core strengthening and nerve glide exercises home exercise program.  Discussed the importance of core strengthening, ROM, stretching exercises with the patient and how each of these entities is important in decreasing pain.  Explained to the patient that the purpose of physical therapy is to teach the patient a home exercise program.  These exercises need to be performed every day in order to decrease pain and prevent future occurrences of pain.        -MEDICATIONS: Did discuss trialing low-dose gabapentin 100 mg to titrate up as tolerated however she prefers to trial physical therapy first and possibly injection.  Discussed multiple medication options today with patient. Discussed risks, side effects, and proper use of medications. Patient verbalized understanding.    -INTERVENTIONS: Did place an order for a Left L4-5 transforaminal epidural steroid injection as she does have moderate to severe left foraminal stenosis at this level due to degenerative disc changes and osteophyte formation.  Patient wants to trial a few sessions of physical therapy first unless her pain is not improving in the next week or so.  Did discuss if she does not get relief with this injection we could consider a left L5-S1 TF MARVA  however does look more mild foraminal stenosis at this level.  She does however help L5 pattern numbness and tingling.  Discussed risks and benefits of injections with patient today.    -PATIENT EDUCATION:  45 minutes of total visit time was spent face to face with the patient today, greater than 50% of total time spent with patient was spent on counseling, education, and coordinating care.   -10 minutes spent outside of visit time, non-face-to-face time, reviewing chart.    -FOLLOW-UP:   Follow-up in 4 weeks, sooner pain is worsening or she decides to get the injection sooner than I recommended following up 2 weeks postinjection.    Advised patient to call the [facility] if symptoms worsen or you have problems controlling bladder and bowel function.   ______________________________________________________________________    SUBJECTIVE:  HPI:  Abril Pinto  Is a 85 y.o. female who presents today for new patient evaluation of low back pain that is chronic in nature across the low back but usually is more retired type feeling worse with standing and walking that resolves with sitting however that is not her biggest concern today.  Patient's bigger concern is more acute pain on the left low back that radiates to the left buttock with some pain into the left shin but more so numbness and tingling to the left lateral thigh anterior shin and into the first toe.  Currently with sitting her pain is a 0/10 but it does get up to a 10/10 with certain times of twisting or movements as well as transitioning from sit to stand it can be very bothersome.  She reports that this pain is been ongoing for the last 6 weeks but worsening.  Patient denies any groin pain.    Patient denies any weakness in her lower extremities, denies right leg pain, denies recent trips or falls or balance changes, denies bowel or bladder dysfunction.    -Treatment to Date: History lumbar laminectomy 2009/10 timeframe.  Patient does not recall  specifically.  Physical therapy before surgery, nothing since.    Lumbar facet joint injections 2008 with benefit.    -Medications:    Current Outpatient Medications on File Prior to Encounter   Medication Sig Dispense Refill     amLODIPine (NORVASC) 10 MG tablet Take 10 mg by mouth daily.       calcium, as carbonate, (OS-ROLA) 500 mg calcium (1,250 mg) tablet Take 1 tablet by mouth daily.       cholecalciferol, vitamin D3, 1,000 unit tablet Take 1,000 Units by mouth daily.       CONTOUR NEXT STRIPS strips Use 1 each As Directed daily. 50 strip 11     ezetimibe (ZETIA) 10 mg tablet Take 10 mg by mouth daily.       generic lancets (MICROLET LANCET) Use 1 each As Directed daily. 100 each 3     levothyroxine (SYNTHROID, LEVOTHROID) 150 MCG tablet TAKE ONE TABLET BY MOUTH ONCE DAILY 90 tablet 3     lisinopril-hydrochlorothiazide (PRINZIDE,ZESTORETIC) 20-25 mg per tablet Take 1 tablet by mouth daily.       metFORMIN (GLUCOPHAGE) 500 MG tablet Take 1 tablet (500 mg total) by mouth 2 (two) times a day with meals. 180 tablet 1     metoprolol succinate (TOPROL-XL) 25 MG TAKE ONE TABLET BY MOUTH ONCE DAILY 90 tablet 1     omeprazole (PRILOSEC) 20 MG capsule Take 20 mg by mouth daily before breakfast.       potassium gluconate 550 mg (90 mg) tablet Take 90 mg by mouth daily.       pravastatin (PRAVACHOL) 40 MG tablet Take 40 mg by mouth daily.       No current facility-administered medications on file prior to encounter.        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     Lichen Planus     Hypothyroidism     Diabetes type 2, controlled (H)     Hyperlipidemia     Thrombocytopenia     Hypertension     Carotid Artery Stenosis     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     Osteopenia     Chronic Reflux Esophagitis     Tachycardia     Urethral stenosis     Peripheral vascular disease (H)     Colon polyps     Hyperlipidemia       Past Surgical History:   Procedure Laterality Date     COLONOSCOPY N/A 6/6/2018    Procedure: COLONOSCOPY WITH POLYPECTOMY;  Surgeon: Zoran Leiva MD;  Location: [facility];  Service:      FEMORAL ARTERY STENT      PTA femoral-popliteal initial with stent     HERNIA REPAIR       LUMBAR LAMINECTOMY       SALPINGOOPHORECTOMY Bilateral 2008     SPINE SURGERY      laminectomy decompressive 2 lumbar segments     TOTAL ABDOMINAL HYSTERECTOMY  1988       Family History   Problem Relation Age of Onset     Cancer Other         adenocarcinoma of the pancreas     Leukemia Other      Hypothyroidism Other      Breast cancer Sister        Reviewed past medical, surgical, and family history with patient found on new patient intake packet located in EMR [employer] tab.     SOCIAL HX: Patient is  and retired.  Patient denies smoking/tobacco use, seldom drinks alcohol, denies history of being a heavy drinker, denies recreational drug use.    ROS: Positive for back pain, joint pain, leg pain, easy bruising.  Specifically negative for bowel/bladder dysfunction, balance changes, headache, dizziness, foot drop, fevers, chills, appetite changes, nausea/vomiting, unexplained weight loss. Otherwise 13 systems reviewed are negative. Please see the patient's intake questionnaire from today for details.    OBJECTIVE:  /66 (Patient Site: Right Arm, Patient Position: Sitting)   Pulse 79   Wt 167 lb  (75.8 kg)   [facility] 12/03/1988   SpO2 95%   BMI 30.54 kg/m      PHYSICAL EXAMINATION:    --CONSTITUTIONAL:  Vital signs as above.  No acute distress.  The patient is well nourished and well groomed.  --PSYCHIATRIC:  Appropriate mood and affect. The patient is awake, alert, oriented to person, place, time and answering questions appropriately with clear speech.    --SKIN:  Skin over the face, bilateral lower extremities, and posterior torso is clean, dry, intact without rashes.    --RESPIRATORY: Normal rhythm and effort. No abnormal accessory muscle breathing patterns noted.   --STANDING EXAMINATION:  Normal lumbar lordosis noted, no lateral shift.  --MUSCULOSKELETAL: Lumbar spine inspection reveals no evidence of deformity. Range of motion is limited in all movements due to pain: Lumbar flexion, extension, lateral rotation.  Mild tenderness palpation generalized lumbar spine paraspinals.  Straight leg raising in the seated position is negative to radicular pain on the right and positive on the left. Sciatic notch mildly tender bilaterally  --GROSS MOTOR: Gait is non-antalgic. Easily arises from a seated position. Toe walking and heel walking are normal without significant difficulty.    --LOWER EXTREMITY MOTOR TESTING:  Plantar flexion left 5/5, right 5/5   Dorsiflexion left 5/5, right 5/5   Great toe MTP extension left 5/5, right 5/5  Knee flexion left 5/5, right 5/5  Knee extension left 5/5, right 5/5   Hip flexion left 5/5, right 5/5  Hip abduction left 5/5, right 5/5  Hip adduction left 5/5, right 5/5   --HIPS: Full range of motion bilaterally.  --NEUROLOGICAL:  2/4 patellar, medial hamstring, and achilles reflexes bilaterally.  Sensation to light touch is intact in the bilateral L4, L5, and S1 dermatomes. Babinski is negative. No clonus.  Negative Devang reflex bilaterally.  --VASCULAR:  2/4 dorsalis pedis and posterior tibialsi pulses bilaterally.  Bilateral lower extremities are warm.  There is no pitting  edema of the bilateral lower extremities.    RESULTS: Prior medical records from [facility] 12/16/2017 to current and care everywhere were reviewed today.    Imaging: Lumbar spine Imaging was personally reviewed and interpreted today. The images were shown to the patient and the findings were explained using a spine model.      Ct Lumbar Spine Without Contrast  Result Date: 12/4/2018 - [facility]  INDICATION: Lumbar radiculopathy COMPARISON: None. TECHNIQUE: Routine without IV contrast. Multiplanar reformats.  Dose reduction techniques were used.   FINDINGS: VERTEBRA: 5 lumbar type vertebra. Mild lumbar levocurvature. Minimal retrolisthesis at L2-L3 and L3-L4 with 3 to 4 mm retrolisthesis at L4-L5. Preserved vertebral body heights with multilevel degenerative type endplate irregularities and patchy endplate sclerosis. No aggressive osseous lesion. No fracture.   CANAL/FORAMINA: Moderate to advanced multilevel lumbar spondylosis with moderate loss of disc height at levels from L1 through L4 and advanced loss of disc height at L4-L5. Mild loss of disc height at L5-S1. Multilevel vacuum disc phenomenon.   Circumferential disc bulging at L1-L2 and L5-S1, and circumferential disc osteophyte complexes at L2-L3, L3-L4, and L4-L5.   Superimposed left central disc herniation containing locules of gas at L2-L3 and suspected broad-based central disc herniations at L3-L4 and L4-L5 Mild to moderate multilevel facet arthropathy from L3 through S1, left greater than right.   At L2-L3, mild trefoil type spinal canal stenosis with low-grade narrowing of the inferior neural foramina bilaterally.   At L3-L4, severe trefoil type spinal canal stenosis with mild to moderate right and mild left neural foraminal stenosis.   At L4-L5, mild to moderate trefoil type spinal canal stenosis with moderate to severe bilateral neural foraminal stenosis.   At L5-S1, mild spinal canal stenosis with mild bilateral neural foraminal stenosis. PARASPINAL:  Posterior paraspinal muscular atrophy. Vascular calcifications.   CONCLUSION:   1.  At L3-L4, severe trefoil type spinal canal stenosis with mild to moderate right greater than left neural foraminal stenosis.   2.  At L4-L5, mild to moderate trefoil type spinal canal stenosis and moderate to severe bilateral neural foraminal stenosis.   3.  At L2-L3, mild trefoil type spinal canal stenosis.   4.  At L5-S1, mild spinal canal stenosis and mild bilateral neural foraminal stenosis.   5.  Moderate to advanced multilevel lumbar spondylosis elsewhere without additional high-grade stenosis as above.

## 2021-06-25 NOTE — PROGRESS NOTES
ASSESSMENT AND PLAN:    1. Spinal stenosis of lumbar region without neurogenic claudication  Ongoing symptoms, confirmed on MRI.     2. Contusion of left lower leg, initial encounter   Exam and history are consistent with contusion of the anterior lower leg without indication of infection or DVT, reassured.     3. Controlled type 2 diabetes mellitus with complication, without long-term current use of insulin   Control is asymptomatic on oral metformin.  No change in current treatment.     Patient Instructions   1. Reassured about right leg bruise.  No concern for clot. Discussed symptomatic treatment.     2. Follow up if fails to improve.     CHIEF COMPLAINT:  Chief Complaint   Patient presents with     Leg Swelling     a couple days of left ankle swelling      HISTORY OF PRESENT ILLNESS:  Abril Pinto is a 85 y.o. female with a bruised area over the lower anterior left leg.  She remembers a trauma to this area a few days ago.  Can walk without very much pain.  Worried about ecchymosis 'spreading' down the foot.  No fever, or chills.  Had no head or neck injury.     REVIEW OF SYSTEMS:   See HPI, all other systems on review are negative.    Past Medical History:   Diagnosis Date     Carotid artery stenosis      Chronic reflux esophagitis      Colon polyps      Diabetes type 2, controlled (H)      Esophageal reflux      Hyperlipidemia      Hypertension      Hypothyroidism      Lichen planus      Osteopenia      Ovarian cyst     benign mucinous epithelial     Peripheral vascular disease (H)      Spinal stenosis of lumbar region without neurogenic claudication 3/20/2019     Tachycardia      Thrombocytopenia (H)      Urethral stenosis 5/2/2018     Social History     Tobacco Use   Smoking Status Former Smoker     Years: 10.00     Types: Cigarettes   Smokeless Tobacco Never Used     Family History   Problem Relation Age of Onset     Cancer Other         adenocarcinoma of the pancreas     Leukemia Other       "Hypothyroidism Other      Breast cancer Sister      Past Surgical History:   Procedure Laterality Date     COLONOSCOPY N/A 6/6/2018    Procedure: COLONOSCOPY WITH POLYPECTOMY;  Surgeon: Zoran Leiva MD;  Location: [facility];  Service:      FEMORAL ARTERY STENT      PTA femoral-popliteal initial with stent     HERNIA REPAIR       LUMBAR LAMINECTOMY       SALPINGOOPHORECTOMY Bilateral 2008     SPINE SURGERY      laminectomy decompressive 2 lumbar segments     TOTAL ABDOMINAL HYSTERECTOMY  1988     VITALS:  Vitals:    03/20/19 1326   BP: 124/60   Patient Site: Left Arm   Patient Position: Sitting   Cuff Size: Adult Regular   Pulse: 68   SpO2: 97%   Weight: 164 lb (74.4 kg)   Height: 5' 2\" (1.575 m)     Wt Readings from Last 3 Encounters:   03/20/19 164 lb (74.4 kg)   12/19/18 167 lb (75.8 kg)   11/23/18 169 lb 1 oz (76.7 kg)     PHYSICAL EXAM:  Constitutional:  In NAD, alert and oriented  Extremities:This area is a contusion without palpable thrombosis, in an area very unlikely a site for DVT - this is in the instep area just proximal to the ankle. There is dissecting ecchymosis around the lateral malleolus. No ankle laxity or erythema.     Current Outpatient Medications   Medication Sig Dispense Refill     amLODIPine (NORVASC) 10 MG tablet Take 10 mg by mouth daily.       calcium, as carbonate, (OS-ROLA) 500 mg calcium (1,250 mg) tablet Take 1 tablet by mouth daily.       cholecalciferol, vitamin D3, 1,000 unit tablet Take 1,000 Units by mouth daily.       CONTOUR NEXT STRIPS strips Use 1 each As Directed daily. 50 strip 11     ezetimibe (ZETIA) 10 mg tablet Take 10 mg by mouth daily.       generic lancets (MICROLET LANCET) Use 1 each As Directed daily. 100 each 3     levothyroxine (SYNTHROID, LEVOTHROID) 150 MCG tablet TAKE ONE TABLET BY MOUTH ONCE DAILY 90 tablet 3     lisinopril-hydrochlorothiazide (PRINZIDE,ZESTORETIC) 20-25 mg per tablet Take 1 tablet by mouth daily.       metFORMIN (GLUCOPHAGE) " 500 MG tablet Take 1 tablet (500 mg total) by mouth 2 (two) times a day with meals. 180 tablet 1     metoprolol succinate (TOPROL-XL) 25 MG TAKE ONE TABLET BY MOUTH ONCE DAILY 90 tablet 1     omeprazole (PRILOSEC) 20 MG capsule Take 20 mg by mouth daily before breakfast.       potassium gluconate 550 mg (90 mg) tablet Take 90 mg by mouth daily.       pravastatin (PRAVACHOL) 40 MG tablet Take 40 mg by mouth daily.       No current facility-administered medications for this visit.      Aayush Velásquez MD  Internal Medicine  [facility]

## 2021-06-25 NOTE — PATIENT INSTRUCTIONS - HE
1. Reassured about right leg bruise.  No concern for clot. Discussed symptomatic treatment.     2. Follow up if fails to improve.

## 2021-07-03 NOTE — ADDENDUM NOTE
Addendum Note by Beltran Mitchell MD at 11/25/2019  2:55 PM     Author: Beltran Mitchell MD Service: -- Author Type: Physician    Filed: 11/25/2019  2:55 PM Encounter Date: 11/25/2019 Status: Signed    : Beltran Mitchell MD (Physician)    Addended by: BELTRAN MITCHELL on: 11/25/2019 02:55 PM        Modules accepted: Orders

## 2021-07-31 ENCOUNTER — HEALTH MAINTENANCE LETTER (OUTPATIENT)
Age: 86
End: 2021-07-31

## 2021-08-03 PROBLEM — C43.62 MALIGNANT MELANOMA OF SKIN OF UPPER LIMB, INCLUDING SHOULDER, LEFT (H): Status: RESOLVED | Noted: 2019-03-24 | Resolved: 2019-03-24

## 2021-08-05 ENCOUNTER — TRANSFERRED RECORDS (OUTPATIENT)
Dept: HEALTH INFORMATION MANAGEMENT | Facility: CLINIC | Age: 86
End: 2021-08-05

## 2021-09-25 ENCOUNTER — HEALTH MAINTENANCE LETTER (OUTPATIENT)
Age: 86
End: 2021-09-25

## 2022-03-12 ENCOUNTER — HEALTH MAINTENANCE LETTER (OUTPATIENT)
Age: 87
End: 2022-03-12

## 2022-08-27 ENCOUNTER — HEALTH MAINTENANCE LETTER (OUTPATIENT)
Age: 87
End: 2022-08-27

## 2023-01-07 ENCOUNTER — HEALTH MAINTENANCE LETTER (OUTPATIENT)
Age: 88
End: 2023-01-07

## 2023-04-22 ENCOUNTER — HEALTH MAINTENANCE LETTER (OUTPATIENT)
Age: 88
End: 2023-04-22

## 2023-12-02 ENCOUNTER — HEALTH MAINTENANCE LETTER (OUTPATIENT)
Age: 88
End: 2023-12-02

## 2024-02-10 ENCOUNTER — HEALTH MAINTENANCE LETTER (OUTPATIENT)
Age: 89
End: 2024-02-10

## 2025-03-26 ENCOUNTER — TRANSCRIBE ORDERS (OUTPATIENT)
Dept: OTHER | Age: OVER 89
End: 2025-03-26

## 2025-03-26 DIAGNOSIS — H54.60 MONOCULAR VISION LOSS: Primary | ICD-10-CM

## 2025-03-27 ENCOUNTER — TELEPHONE (OUTPATIENT)
Dept: OPHTHALMOLOGY | Facility: CLINIC | Age: OVER 89
End: 2025-03-27

## 2025-03-27 NOTE — TELEPHONE ENCOUNTER
Received nurse triage message yesterday.  Can be scheduled next available new neuro with Dr. Singh.  Message already forwarded to  yesterday.      Yessica Flanagan on 3/27/2025 at 9:13 AM

## 2025-03-27 NOTE — TELEPHONE ENCOUNTER
[facility] Call Center    Phone Message    May a detailed message be left on voicemail: yes     Reason for Call: Appointment Intake    Referring Provider Name: Agustín Morales MD in GENERIC EXTERNAL DATA DEPARTMENT  Diagnosis and/or Symptoms: Monocular vision loss [H54.60]    Monocular vision loss. Dr. Federico CABRALES of [facility]  Neuro-ophth.    Sent Urgent 3-5 days    Action Taken: Message routed to:  [facility]    Travel Screening: Not Applicable     Date of Service: